# Patient Record
Sex: FEMALE | Race: WHITE | HISPANIC OR LATINO | Employment: FULL TIME | URBAN - METROPOLITAN AREA
[De-identification: names, ages, dates, MRNs, and addresses within clinical notes are randomized per-mention and may not be internally consistent; named-entity substitution may affect disease eponyms.]

---
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
## 2021-01-25 ENCOUNTER — APPOINTMENT (OUTPATIENT)
Dept: RADIOLOGY | Facility: MEDICAL CENTER | Age: 21
End: 2021-01-25
Attending: EMERGENCY MEDICINE
Payer: MEDICAID

## 2021-01-25 ENCOUNTER — HOSPITAL ENCOUNTER (EMERGENCY)
Facility: MEDICAL CENTER | Age: 21
End: 2021-01-25
Attending: EMERGENCY MEDICINE
Payer: MEDICAID

## 2021-01-25 VITALS
SYSTOLIC BLOOD PRESSURE: 105 MMHG | TEMPERATURE: 97.8 F | WEIGHT: 152.34 LBS | OXYGEN SATURATION: 99 % | BODY MASS INDEX: 23.91 KG/M2 | HEART RATE: 77 BPM | HEIGHT: 67 IN | DIASTOLIC BLOOD PRESSURE: 60 MMHG | RESPIRATION RATE: 16 BRPM

## 2021-01-25 DIAGNOSIS — N39.0 URINARY TRACT INFECTION WITHOUT HEMATURIA, SITE UNSPECIFIED: ICD-10-CM

## 2021-01-25 DIAGNOSIS — R10.32 LEFT LOWER QUADRANT ABDOMINAL PAIN: ICD-10-CM

## 2021-01-25 DIAGNOSIS — Z34.90 INTRAUTERINE PREGNANCY: ICD-10-CM

## 2021-01-25 LAB
ALBUMIN SERPL BCP-MCNC: 3.7 G/DL (ref 3.2–4.9)
ALBUMIN/GLOB SERPL: 1.1 G/DL
ALP SERPL-CCNC: 84 U/L (ref 30–99)
ALT SERPL-CCNC: 10 U/L (ref 2–50)
ANION GAP SERPL CALC-SCNC: 11 MMOL/L (ref 7–16)
APPEARANCE UR: CLEAR
AST SERPL-CCNC: 19 U/L (ref 12–45)
B-HCG SERPL-ACNC: ABNORMAL MIU/ML (ref 0–10)
BACTERIA #/AREA URNS HPF: ABNORMAL /HPF
BASOPHILS # BLD AUTO: 0.3 % (ref 0–1.8)
BASOPHILS # BLD: 0.03 K/UL (ref 0–0.12)
BILIRUB SERPL-MCNC: <0.2 MG/DL (ref 0.1–1.5)
BILIRUB UR QL STRIP.AUTO: NEGATIVE
BUN SERPL-MCNC: 6 MG/DL (ref 8–22)
CALCIUM SERPL-MCNC: 9 MG/DL (ref 8.4–10.2)
CHLORIDE SERPL-SCNC: 102 MMOL/L (ref 96–112)
CO2 SERPL-SCNC: 22 MMOL/L (ref 20–33)
COLOR UR: YELLOW
CREAT SERPL-MCNC: 0.4 MG/DL (ref 0.5–1.4)
EOSINOPHIL # BLD AUTO: 0.07 K/UL (ref 0–0.51)
EOSINOPHIL NFR BLD: 0.6 % (ref 0–6.9)
EPI CELLS #/AREA URNS HPF: ABNORMAL /HPF
ERYTHROCYTE [DISTWIDTH] IN BLOOD BY AUTOMATED COUNT: 44.4 FL (ref 35.9–50)
GLOBULIN SER CALC-MCNC: 3.4 G/DL (ref 1.9–3.5)
GLUCOSE SERPL-MCNC: 87 MG/DL (ref 65–99)
GLUCOSE UR STRIP.AUTO-MCNC: NEGATIVE MG/DL
HCT VFR BLD AUTO: 35 % (ref 37–47)
HGB BLD-MCNC: 10.6 G/DL (ref 12–16)
HYALINE CASTS #/AREA URNS LPF: ABNORMAL /LPF
IMM GRANULOCYTES # BLD AUTO: 0.04 K/UL (ref 0–0.11)
IMM GRANULOCYTES NFR BLD AUTO: 0.4 % (ref 0–0.9)
KETONES UR STRIP.AUTO-MCNC: NEGATIVE MG/DL
LEUKOCYTE ESTERASE UR QL STRIP.AUTO: ABNORMAL
LYMPHOCYTES # BLD AUTO: 1.81 K/UL (ref 1–4.8)
LYMPHOCYTES NFR BLD: 16.3 % (ref 22–41)
MCH RBC QN AUTO: 21.6 PG (ref 27–33)
MCHC RBC AUTO-ENTMCNC: 30.3 G/DL (ref 33.6–35)
MCV RBC AUTO: 71.4 FL (ref 81.4–97.8)
MICRO URNS: ABNORMAL
MONOCYTES # BLD AUTO: 0.81 K/UL (ref 0–0.85)
MONOCYTES NFR BLD AUTO: 7.3 % (ref 0–13.4)
MUCOUS THREADS #/AREA URNS HPF: ABNORMAL /HPF
NEUTROPHILS # BLD AUTO: 8.36 K/UL (ref 2–7.15)
NEUTROPHILS NFR BLD: 75.1 % (ref 44–72)
NITRITE UR QL STRIP.AUTO: NEGATIVE
NRBC # BLD AUTO: 0 K/UL
NRBC BLD-RTO: 0 /100 WBC
PH UR STRIP.AUTO: 7 [PH] (ref 5–8)
PLATELET # BLD AUTO: 346 K/UL (ref 164–446)
PMV BLD AUTO: 11.2 FL (ref 9–12.9)
POTASSIUM SERPL-SCNC: 3.8 MMOL/L (ref 3.6–5.5)
PROT SERPL-MCNC: 7.1 G/DL (ref 6–8.2)
PROT UR QL STRIP: NEGATIVE MG/DL
RBC # BLD AUTO: 4.9 M/UL (ref 4.2–5.4)
RBC # URNS HPF: ABNORMAL /HPF
RBC UR QL AUTO: NEGATIVE
SODIUM SERPL-SCNC: 135 MMOL/L (ref 135–145)
SP GR UR STRIP.AUTO: 1.02
WBC # BLD AUTO: 11.1 K/UL (ref 4.8–10.8)
WBC #/AREA URNS HPF: ABNORMAL /HPF

## 2021-01-25 PROCEDURE — 81001 URINALYSIS AUTO W/SCOPE: CPT

## 2021-01-25 PROCEDURE — A9270 NON-COVERED ITEM OR SERVICE: HCPCS | Performed by: EMERGENCY MEDICINE

## 2021-01-25 PROCEDURE — 76705 ECHO EXAM OF ABDOMEN: CPT

## 2021-01-25 PROCEDURE — 76815 OB US LIMITED FETUS(S): CPT

## 2021-01-25 PROCEDURE — 80053 COMPREHEN METABOLIC PANEL: CPT

## 2021-01-25 PROCEDURE — 99284 EMERGENCY DEPT VISIT MOD MDM: CPT

## 2021-01-25 PROCEDURE — 84702 CHORIONIC GONADOTROPIN TEST: CPT

## 2021-01-25 PROCEDURE — 36415 COLL VENOUS BLD VENIPUNCTURE: CPT

## 2021-01-25 PROCEDURE — 700102 HCHG RX REV CODE 250 W/ 637 OVERRIDE(OP): Performed by: EMERGENCY MEDICINE

## 2021-01-25 PROCEDURE — 85025 COMPLETE CBC W/AUTO DIFF WBC: CPT

## 2021-01-25 RX ORDER — CEPHALEXIN 500 MG/1
500 CAPSULE ORAL 4 TIMES DAILY
Qty: 20 CAP | Refills: 0 | Status: ON HOLD | OUTPATIENT
Start: 2021-01-25 | End: 2021-07-03

## 2021-01-25 RX ORDER — CEPHALEXIN 250 MG/1
500 CAPSULE ORAL ONCE
Status: COMPLETED | OUTPATIENT
Start: 2021-01-25 | End: 2021-01-25

## 2021-01-25 RX ADMIN — CEPHALEXIN 500 MG: 250 CAPSULE ORAL at 13:01

## 2021-01-25 NOTE — ED NOTES
Pt given d/c paperwork and RX p/u information, pt verbalized understanding all information given. Pt ambulated out of the ER w/o difficulty w/ family

## 2021-01-25 NOTE — ED PROVIDER NOTES
"ED Provider Note    ED Provider    Means of arrival: Private vehicle  History obtained from: Patient  History limited by: None    CHIEF COMPLAINT  Chief Complaint   Patient presents with   • Pregnancy Problem     Low left sided abdominal pain started yesterday       HPI  Porfirio Keenan is a 20 y.o. female who presents with complaints of left lower quadrant abdominal pain.  This started last evening is been constant.  Is been waxing and waning.  This time is moderate.  Has been isolated to the left lower quadrant.  She has no flank pain, no pelvic pain.  She has no dysuria or hematuria.  She is noted to be 14 weeks pregnant is been seen by her OB and has had ultrasounds done.    Said no fevers chills or sweats no nausea no vomiting no diarrhea she complains of some constipation but did have a bowel movement yesterday without difficulty.    The pain is described as sharp.    REVIEW OF SYSTEMS  See HPI for further details. All other systems are negative.     PAST MEDICAL HISTORY   has a past medical history of Anemia, Breath shortness, and Cold.    SOCIAL HISTORY  Social History     Tobacco Use   • Smoking status: Never Smoker   • Smokeless tobacco: Never Used   Substance and Sexual Activity   • Alcohol use: No   • Drug use: No   • Sexual activity: Not on file       SURGICAL HISTORY  patient denies any surgical history    CURRENT MEDICATIONS  Home Medications    **Home medications have not yet been reviewed for this encounter**         ALLERGIES  No Known Allergies    PHYSICAL EXAM  VITAL SIGNS: /60   Pulse 77   Temp 36.6 °C (97.8 °F) (Temporal)   Resp 16   Ht 1.702 m (5' 7\")   Wt 69.1 kg (152 lb 5.4 oz)   LMP 10/17/2020   SpO2 99%   Breastfeeding No   BMI 23.86 kg/m²   Constitutional: Alert in no apparent distress.  HENT: No signs of trauma, Mucous membranes are moist   Eyes:  Conjunctiva normal, Non-icteric.   Neck: Normal range of motion, No tenderness, Supple,  Lymphatic: No lymphadenopathy noted. "   Cardiovascular: Regular rate and rhythm, no murmurs.   Thorax & Lungs: Normal breath sounds, No respiratory distress, No wheezing, No chest tenderness.   Abdomen: Bowel sounds normal, Soft, minimal tenderness left lower pelvis, no midline tenderness, No masses, No pulsatile masses. No peritoneal signs.  Skin: Warm, Dry,Normal color  Back: No bony tenderness, No CVA tenderness.   Extremities:No edema, No tenderness, No cyanosis,    Musculoskeletal: Good range of motion in all major joints. No tenderness to palpation or major deformities noted.   Neurologic: Alert ,Oriented x4, Normal motor function, Normal sensory function, No focal deficits noted.   Psychiatric: Affect normal, Judgment normal, Mood normal.       MEDICAL DECISION MAKING  This is a 20 y.o. female who presents with complaints of pain as described.  Left lower quadrant she is too young to consider diverticulitis or diverticulosis.  Round ligament pain could be etiology, appendix cannot be completely ruled out so CBC will be done to evaluate for significant elevation of white blood cell count.  Ultrasound will be done to evaluate for ovarian cyst, and will be used to attempt visualization of appendix.  Urinalysis with 1 to evaluate for urinary infection.    DIAGNOSTIC STUDIES / PROCEDURES    EKG      LABS  Results for orders placed or performed during the hospital encounter of 01/25/21   URINALYSIS,CULTURE IF INDICATED    Specimen: Urine   Result Value Ref Range    Color Yellow     Character Clear     Specific Gravity 1.020 <1.035    Ph 7.0 5.0 - 8.0    Glucose Negative Negative mg/dL    Ketones Negative Negative mg/dL    Protein Negative Negative mg/dL    Bilirubin Negative Negative    Nitrite Negative Negative    Leukocyte Esterase Small (A) Negative    Occult Blood Negative Negative    Micro Urine Req Microscopic    URINE MICROSCOPIC (W/UA)   Result Value Ref Range    WBC 10-20 (A) /hpf    RBC 0-2 /hpf    Bacteria Moderate (A) None /hpf     Epithelial Cells Many (A) Few /hpf    Mucous Threads Moderate /hpf    Hyaline Cast 0-2 /lpf   CBC WITH DIFFERENTIAL   Result Value Ref Range    WBC 11.1 (H) 4.8 - 10.8 K/uL    RBC 4.90 4.20 - 5.40 M/uL    Hemoglobin 10.6 (L) 12.0 - 16.0 g/dL    Hematocrit 35.0 (L) 37.0 - 47.0 %    MCV 71.4 (L) 81.4 - 97.8 fL    MCH 21.6 (L) 27.0 - 33.0 pg    MCHC 30.3 (L) 33.6 - 35.0 g/dL    RDW 44.4 35.9 - 50.0 fL    Platelet Count 346 164 - 446 K/uL    MPV 11.2 9.0 - 12.9 fL    Neutrophils-Polys 75.10 (H) 44.00 - 72.00 %    Lymphocytes 16.30 (L) 22.00 - 41.00 %    Monocytes 7.30 0.00 - 13.40 %    Eosinophils 0.60 0.00 - 6.90 %    Basophils 0.30 0.00 - 1.80 %    Immature Granulocytes 0.40 0.00 - 0.90 %    Nucleated RBC 0.00 /100 WBC    Neutrophils (Absolute) 8.36 (H) 2.00 - 7.15 K/uL    Lymphs (Absolute) 1.81 1.00 - 4.80 K/uL    Monos (Absolute) 0.81 0.00 - 0.85 K/uL    Eos (Absolute) 0.07 0.00 - 0.51 K/uL    Baso (Absolute) 0.03 0.00 - 0.12 K/uL    Immature Granulocytes (abs) 0.04 0.00 - 0.11 K/uL    NRBC (Absolute) 0.00 K/uL   COMP METABOLIC PANEL   Result Value Ref Range    Sodium 135 135 - 145 mmol/L    Potassium 3.8 3.6 - 5.5 mmol/L    Chloride 102 96 - 112 mmol/L    Co2 22 20 - 33 mmol/L    Anion Gap 11.0 7.0 - 16.0    Glucose 87 65 - 99 mg/dL    Bun 6 (L) 8 - 22 mg/dL    Creatinine 0.40 (L) 0.50 - 1.40 mg/dL    Calcium 9.0 8.4 - 10.2 mg/dL    AST(SGOT) 19 12 - 45 U/L    ALT(SGPT) 10 2 - 50 U/L    Alkaline Phosphatase 84 30 - 99 U/L    Total Bilirubin <0.2 0.1 - 1.5 mg/dL    Albumin 3.7 3.2 - 4.9 g/dL    Total Protein 7.1 6.0 - 8.2 g/dL    Globulin 3.4 1.9 - 3.5 g/dL    A-G Ratio 1.1 g/dL   HCG QUANTITATIVE SERUM   Result Value Ref Range    Bhcg 51100.0 (H) 0.0 - 10.0 mIU/mL   ESTIMATED GFR   Result Value Ref Range    GFR If African American >60 >60 mL/min/1.73 m 2    GFR If Non African American >60 >60 mL/min/1.73 m 2         RADIOLOGY  US-APPENDIX   Final Result      Appendix is not identified sonographically. No ancillary  findings to support acute appendicitis.      US-OB LIMITED TRANSABDOMINAL   Final Result      Single intrauterine pregnancy of an estimated gestational age of 15 weeks with an estimated date of delivery of 7/24/2021.      Viable single intrauterine gestation with no perigestational hemorrhage, cervical shortening or other concerning findings      No free pelvic fluid         INTERPRETING LOCATION: [address], [place], [address]          COURSE  Pertinent Labs & Imaging studies reviewed. (See chart for details)    11:01 AM - Patient seen and examined at bedside. Discussed plan of care,      Patient present with left-sided pelvic pain.  Ultrasound was done shows no problems with pregnancy.  There is a live IUP at 15 weeks.  There is no signs of ovarian cyst.  Attempts were made to visualize the appendix which were unsuccessful.    Urinalysis shows a mild urinary tract infection, she is being placed on antibiotics for this.  I cannot specify the cause of her left lower quadrant abdominal pain.  Her white blood cell count is 11.1 which is only mildly elevated she has no peritoneal signs I do not suspect appendicitis due to physical exam.    I cannot completely exclude appendicitis with the patient is instructed to return if her symptoms are not resolved in 12 to 24 hours at which time consideration for CT will be made as CT has contraindicated during pregnancy.      Discharge home in stable condition    FINAL IMPRESSION  1. Left lower quadrant abdominal pain    2. Intrauterine pregnancy    3. Urinary tract infection without hematuria, site unspecified        The note accurately reflects work and decisions made by me.  Gutierrez Ramon D.O.  1/25/2021  1:51 PM

## 2021-06-03 ENCOUNTER — HOSPITAL ENCOUNTER (EMERGENCY)
Facility: MEDICAL CENTER | Age: 21
End: 2021-06-03
Attending: OBSTETRICS & GYNECOLOGY | Admitting: OBSTETRICS & GYNECOLOGY
Payer: MEDICAID

## 2021-06-03 VITALS
HEART RATE: 108 BPM | BODY MASS INDEX: 23.51 KG/M2 | HEIGHT: 68 IN | SYSTOLIC BLOOD PRESSURE: 123 MMHG | DIASTOLIC BLOOD PRESSURE: 78 MMHG | OXYGEN SATURATION: 99 % | TEMPERATURE: 96.9 F

## 2021-06-03 LAB
APPEARANCE UR: ABNORMAL
COLOR UR AUTO: YELLOW
GLUCOSE UR QL STRIP.AUTO: NEGATIVE MG/DL
KETONES UR QL STRIP.AUTO: NEGATIVE MG/DL
LEUKOCYTE ESTERASE UR QL STRIP.AUTO: ABNORMAL
NITRITE UR QL STRIP.AUTO: NEGATIVE
PH UR STRIP.AUTO: 7 [PH] (ref 5–8)
PROT UR QL STRIP: NEGATIVE MG/DL
RBC UR QL AUTO: NEGATIVE
SP GR UR STRIP.AUTO: 1.02 (ref 1–1.03)

## 2021-06-03 PROCEDURE — 302449 STATCHG TRIAGE ONLY (STATISTIC)

## 2021-06-03 PROCEDURE — 81002 URINALYSIS NONAUTO W/O SCOPE: CPT

## 2021-06-03 PROCEDURE — 59025 FETAL NON-STRESS TEST: CPT

## 2021-06-03 ASSESSMENT — PAIN SCALES - GENERAL: PAINLEVEL: 3

## 2021-06-03 NOTE — PROGRESS NOTES
[facility]    2021    [facility]   32w5d    0855: TOCO/US applied, pt educated. Pt presents with c/o round ligament pain that started yesterday. Pt states pain is in the lower left abdominal area. Pt declines LOF, VB, UC's, and states +FM.     918: Dr. Becerra updated with patient status, orders received to discharge patient home with  labor precautions.     930: Discharge instructions gone over with patient, all questions and concerns addressed.

## 2021-06-03 NOTE — CONSULTS
DATE OF SERVICE:  2021     NST WITH CONSULTATION     This is a patient of mine who is a 20-year-old  1, para 0 at 32 and   5/7th weeks' gestational age.  She presented to the hospital with complaints   of lower abdominal pain, which we have decided is most likely round ligament   pain.  She has been monitored here on labor and delivery and has a category 1   reactive tracing in the 140s.  We ran her urine, which is completely clean.    She does not have any uterine contractions noted on the monitor.  We have   explained to her the round ligament pain is very common in pregnancy and she   will now be discharged home in stable condition with instructions to follow up   at her regular scheduled appointment in my office.        ______________________________  Corinne E. Capurro, MD C[facility]/CLAUDIA    DD:  2021 09:26  DT:  2021 12:24    Job#:  [id number]

## 2021-06-18 ENCOUNTER — HOSPITAL ENCOUNTER (EMERGENCY)
Facility: MEDICAL CENTER | Age: 21
End: 2021-06-18
Attending: OBSTETRICS & GYNECOLOGY | Admitting: OBSTETRICS & GYNECOLOGY
Payer: MEDICAID

## 2021-06-18 VITALS
DIASTOLIC BLOOD PRESSURE: 54 MMHG | RESPIRATION RATE: 20 BRPM | SYSTOLIC BLOOD PRESSURE: 101 MMHG | TEMPERATURE: 97.2 F | HEART RATE: 105 BPM | BODY MASS INDEX: 27.78 KG/M2 | HEIGHT: 67 IN | WEIGHT: 177 LBS

## 2021-06-18 LAB
ALBUMIN SERPL BCP-MCNC: 3 G/DL (ref 3.2–4.9)
ALBUMIN/GLOB SERPL: 0.9 G/DL
ALP SERPL-CCNC: 161 U/L (ref 30–99)
ALT SERPL-CCNC: 11 U/L (ref 2–50)
ANION GAP SERPL CALC-SCNC: 9 MMOL/L (ref 7–16)
ANISOCYTOSIS BLD QL SMEAR: ABNORMAL
APPEARANCE UR: CLEAR
AST SERPL-CCNC: 20 U/L (ref 12–45)
BASOPHILS # BLD AUTO: 0.2 % (ref 0–1.8)
BASOPHILS # BLD: 0.02 K/UL (ref 0–0.12)
BILIRUB SERPL-MCNC: 0.2 MG/DL (ref 0.1–1.5)
BUN SERPL-MCNC: 7 MG/DL (ref 8–22)
CALCIUM SERPL-MCNC: 8.7 MG/DL (ref 8.5–10.5)
CHLORIDE SERPL-SCNC: 104 MMOL/L (ref 96–112)
CO2 SERPL-SCNC: 21 MMOL/L (ref 20–33)
COLOR UR AUTO: YELLOW
COMMENT 1642: NORMAL
CREAT SERPL-MCNC: 0.43 MG/DL (ref 0.5–1.4)
CREAT UR-MCNC: 207.24 MG/DL
DACRYOCYTES BLD QL SMEAR: NORMAL
EOSINOPHIL # BLD AUTO: 0.06 K/UL (ref 0–0.51)
EOSINOPHIL NFR BLD: 0.5 % (ref 0–6.9)
ERYTHROCYTE [DISTWIDTH] IN BLOOD BY AUTOMATED COUNT: 55.8 FL (ref 35.9–50)
GLOBULIN SER CALC-MCNC: 3.3 G/DL (ref 1.9–3.5)
GLUCOSE SERPL-MCNC: 93 MG/DL (ref 65–99)
GLUCOSE UR QL STRIP.AUTO: NEGATIVE MG/DL
HCT VFR BLD AUTO: 31.2 % (ref 37–47)
HGB BLD-MCNC: 8.2 G/DL (ref 12–16)
IMM GRANULOCYTES # BLD AUTO: 0.12 K/UL (ref 0–0.11)
IMM GRANULOCYTES NFR BLD AUTO: 1 % (ref 0–0.9)
KETONES UR QL STRIP.AUTO: NEGATIVE MG/DL
LEUKOCYTE ESTERASE UR QL STRIP.AUTO: NEGATIVE
LYMPHOCYTES # BLD AUTO: 1.75 K/UL (ref 1–4.8)
LYMPHOCYTES NFR BLD: 14.9 % (ref 22–41)
MACROCYTES BLD QL SMEAR: ABNORMAL
MCH RBC QN AUTO: 17.6 PG (ref 27–33)
MCHC RBC AUTO-ENTMCNC: 26.3 G/DL (ref 33.6–35)
MCV RBC AUTO: 66.8 FL (ref 81.4–97.8)
MICROCYTES BLD QL SMEAR: ABNORMAL
MONOCYTES # BLD AUTO: 0.82 K/UL (ref 0–0.85)
MONOCYTES NFR BLD AUTO: 7 % (ref 0–13.4)
MORPHOLOGY BLD-IMP: NORMAL
NEUTROPHILS # BLD AUTO: 9 K/UL (ref 2–7.15)
NEUTROPHILS NFR BLD: 76.4 % (ref 44–72)
NITRITE UR QL STRIP.AUTO: NEGATIVE
NRBC # BLD AUTO: 0.04 K/UL
NRBC BLD-RTO: 0.3 /100 WBC
OVALOCYTES BLD QL SMEAR: NORMAL
PH UR STRIP.AUTO: 5.5 [PH] (ref 5–8)
PLATELET # BLD AUTO: 273 K/UL (ref 164–446)
PLATELET BLD QL SMEAR: NORMAL
POIKILOCYTOSIS BLD QL SMEAR: NORMAL
POLYCHROMASIA BLD QL SMEAR: NORMAL
POTASSIUM SERPL-SCNC: 3.8 MMOL/L (ref 3.6–5.5)
PROT SERPL-MCNC: 6.3 G/DL (ref 6–8.2)
PROT UR QL STRIP: 100 MG/DL
PROT UR-MCNC: 175 MG/DL (ref 0–15)
PROT/CREAT UR: 844 MG/G (ref 10–107)
RBC # BLD AUTO: 4.67 M/UL (ref 4.2–5.4)
RBC BLD AUTO: PRESENT
RBC UR QL AUTO: ABNORMAL
SODIUM SERPL-SCNC: 134 MMOL/L (ref 135–145)
SP GR UR STRIP.AUTO: >=1.03 (ref 1–1.03)
WBC # BLD AUTO: 11.8 K/UL (ref 4.8–10.8)

## 2021-06-18 PROCEDURE — 36415 COLL VENOUS BLD VENIPUNCTURE: CPT

## 2021-06-18 PROCEDURE — 82570 ASSAY OF URINE CREATININE: CPT

## 2021-06-18 PROCEDURE — 85025 COMPLETE CBC W/AUTO DIFF WBC: CPT

## 2021-06-18 PROCEDURE — 99283 EMERGENCY DEPT VISIT LOW MDM: CPT

## 2021-06-18 PROCEDURE — 59025 FETAL NON-STRESS TEST: CPT

## 2021-06-18 PROCEDURE — 80053 COMPREHEN METABOLIC PANEL: CPT

## 2021-06-18 PROCEDURE — 81002 URINALYSIS NONAUTO W/O SCOPE: CPT

## 2021-06-18 PROCEDURE — 700102 HCHG RX REV CODE 250 W/ 637 OVERRIDE(OP): Performed by: OBSTETRICS & GYNECOLOGY

## 2021-06-18 PROCEDURE — A9270 NON-COVERED ITEM OR SERVICE: HCPCS | Performed by: OBSTETRICS & GYNECOLOGY

## 2021-06-18 PROCEDURE — 84156 ASSAY OF PROTEIN URINE: CPT

## 2021-06-18 RX ORDER — ACETAMINOPHEN 500 MG
1000 TABLET ORAL ONCE
Status: COMPLETED | OUTPATIENT
Start: 2021-06-18 | End: 2021-06-18

## 2021-06-18 RX ADMIN — ACETAMINOPHEN 1000 MG: 500 TABLET ORAL at 01:43

## 2021-06-18 ASSESSMENT — FIBROSIS 4 INDEX: FIB4 SCORE: 0.33

## 2021-06-18 NOTE — ED PROVIDER NOTES
Non-Stress Test    Date: 21    Maternal chief complaint: Lower extremity edema and headache.    NST Interpretation:  Baseline: 130 bpm  Variability: moderate  Accelerations: present  Decelerations: absent  Tocometer: quiet    Interpretation: Reactive and reassuring for gestational age.     Assessment:  20 year old  at 34+6 weeks  1.  intrauterine pregnancy at 34+6 weeks  2. Lower extremity edema  3. Headache    Discussion: Bps are normal and pre-eclampsia labs are normal. Per RN the patient's headache resolved with Tylenol PO. Pr/Cr ratio is pending. Patient to follow up with Dr. Becerra tomorrow. Recommended Nuun electrolytes for edema and headaches. 1/2 tab in 40oz. of water three times a day.     Plan:  - Okay to discharge home  -  labor precautions  - Fetal kick counts  - Follow up outpatient with Dr. Becerra tomorrow.

## 2021-06-18 NOTE — DISCHARGE INSTRUCTIONS
General Instructions:  · If you think you are in labor, time contractions (lying on your left side) from the beginning of one contraction to the beginning of the next contraction for at least one hour.  · Increase fluid intake: you should consume 10-12 8 oz glasses of non-caffeinated fluid per day.  · Report any pressure or burning on urination to your physician.  · Monitor fetal movement: If you notice an absence or decrease in fetal movement, drink a large glass of water and rest on your side.  If there is no increase in movement, call your physician or go to the hospital for further evaluation.  · Report any sudden, sharp abdominal pain.  · Report any bleeding.  Spotting or pinkish discharge is normal after vaginal exam.  You may also spot after sexual intercourse.    Pre-term Labor (<37 weeks):  Call your physician or return to the hospital if:  · You have painless regular contractions more than 4 in one hour.  · Your water breaks (remember time and color).  · You have menstrual-like cramps, a low dull backache or pressure in your pelvis or back.  · Your baby does not move enough to complete the daily kick count (10 movements in 2 hours).  · Your baby moves much less often than on the days before or you have not felt your baby move all day.  · Please review the MEDICATION LIST section of your AFTER VISIT SUMMARY document.  · Take your medication as prescribed    High Blood Pressure:  · Rest on your right or left side.  · Report any severe headaches, dizziness, blurred vision or spots before your eyes.  · Report excessive swelling of your hands, face or feet.  · Report epigastric pain (upper abdominal pain)      Other Instructions:  If Headache worsens then come back to triage for monitoring  Take NUUN electrolyte replacement, 0.5 tablet in 40oz of water, 3 times a day.  Drink at least a gallon of water a day  OTC tylenol for headache or discomfort.  Please carefully review your entire AFTER VISIT SUMMARY  document for all discharge instructions.

## 2021-06-18 NOTE — PROGRESS NOTES
"0105- pt is a , 34.6 weeks gestation IUP, with c/o visual \"fogginess\", \"extreme\" HA, and nausea q1hour. BLE pitting edema noted. No c/o RUQ pain, lof, bleeding, uc's or dfm. efm and toco placed, vss, BP elevated, cycling.   0120- Dr. Griffin called and given report, received orders for PIH w/u, and tylenol 1000mg po x1. Discussed poc with pt. Tylenol given po (see mar)  0230- Dr. Griffin called to check in on pt, pt states HA has improved, received orders to discharge home without p/cr results, inform pt of NUUN electrolyte replacement tablets 3x/day, follow up next week. Discussed poc with pt  0245- Dr. Griffin called and updated with p/cr results, change plan for pt to come to office later today for BP check. If HA worsens then return to triage. Discussed poc with pt.  0250- Dr. Griffin at bedside discussing poc with pt.  0300- addressed discharge instructions with PTL precautions and s/s pre-e, BP check in office today, return to triage if HA worsens, all questions answered, verbalized understanding. Left off floor stable, undelivered, with SO at side.  "

## 2021-06-29 ENCOUNTER — HOSPITAL ENCOUNTER (OUTPATIENT)
Dept: OBGYN | Facility: MEDICAL CENTER | Age: 21
End: 2021-06-29
Attending: OBSTETRICS & GYNECOLOGY
Payer: MEDICAID

## 2021-06-29 LAB
SARS-COV-2 RNA RESP QL NAA+PROBE: NOTDETECTED
SPECIMEN SOURCE: NORMAL

## 2021-06-29 PROCEDURE — U0003 INFECTIOUS AGENT DETECTION BY NUCLEIC ACID (DNA OR RNA); SEVERE ACUTE RESPIRATORY SYNDROME CORONAVIRUS 2 (SARS-COV-2) (CORONAVIRUS DISEASE [COVID-19]), AMPLIFIED PROBE TECHNIQUE, MAKING USE OF HIGH THROUGHPUT TECHNOLOGIES AS DESCRIBED BY CMS-2020-01-R: HCPCS

## 2021-06-29 PROCEDURE — U0005 INFEC AGEN DETEC AMPLI PROBE: HCPCS

## 2021-06-30 NOTE — H&P
DATE OF SERVICE:  2021     PATIENT IDENTIFICATION:  A 20-year-old  1, para 0 at 34 weeks and 6   days by last menstrual period, EDC 2021.     CHIEF COMPLAINT:  Lower extremity edema and headaches.     HISTORY OF PRESENT ILLNESS:  The patient has prenatal care with Dr. Becerra.    Her pregnancy has been uncomplicated.  Per chart review, she has had no   elevated blood pressures thus far in her pregnancy.  The patient presented   today with complaints of bilateral lower extremity edema as well as headaches.    She has not taken anything for her headache.  She denies visual changes or   right upper quadrant pain.  She endorses positive fetal movement.  She denies   vaginal bleeding or loss of fluid.  She denies contractions.     REVIEW OF SYSTEMS:  Denies fevers, chills, shortness of breath, chest pain,   nausea, vomiting, diarrhea, or dysuria.     PHYSICAL EXAMINATION:   VITAL SIGNS:  Blood pressure is to 110s-120s over 60s-80s.  GENERAL:  Alert, conversational, pleasant, no acute distress.  HEENT:  Moist mucous membranes.  CARDIOVASCULAR:  Regular rate.  PULMONARY:  No respiratory distress.  CHEST:  Symmetric expansion.  ABDOMEN:  Soft, nontender, nondistended, gravid.  GENITOURINARY:  Exam deferred.  EXTREMITIES:  A 1+ edema in the bilateral lower extremities up to the level of   the knees.  Moves all without difficulty.     LABORATORY STUDIES:  Hemoglobin 18.2, platelets 273.  Creatinine 0.43.  AST   20, ALT 11.  Protein creatinine ratio 844.     ASSESSMENT: A 20-year-old  1, para 0 at 34 weeks and 6 days by last   menstrual period, EDC 2021.    1.   intrauterine pregnancy at 34 weeks and 6 days.  2.  Bilateral lower extremity edema.  3.  Headache, now resolved.  4.  Elevated protein creatinine ratio.  5.  Iron deficiency anemia.     DISCUSSION:  The patient was seen and evaluated at bedside.  She had reported   poor p.o. intake with the increase in temperature outside.  She  was given   Tylenol in triage and her headache is went from 6/10 to 2/10.  The patient's   blood pressures were all normal in triage.  She does have an elevated protein   creatinine ratio of 844.  She was also found to be anemic.  I recommended that   she follow up with Dr. Becerra or one of our nurse practitioners tomorrow for   a blood pressure check.  Preeclampsia precautions were discussed with her.    At this time, she does not meet diagnostic criteria for gestational   hypertension or preeclampsia; however, her symptoms suggest that she has   likely developing a hypertensive disorder in pregnancy.     PLAN:   1.  Okay to discharge home.  2.  Preeclampsia precautions.  3.   labor precautions.  4.  Fetal kick counts.  5.  Ferrous sulfate 325 mg p.o. b.i.d.  6.  Nuun electrolytes half a tab in 40 ounces of water 3 times a day.        ______________________________  MD CHIOMA Rush/KRISTA    DD:  2021 07:41  DT:  2021 07:55    Job#:  [id number]

## 2021-07-03 ENCOUNTER — HOSPITAL ENCOUNTER (INPATIENT)
Facility: MEDICAL CENTER | Age: 21
LOS: 2 days | End: 2021-07-05
Attending: OBSTETRICS & GYNECOLOGY | Admitting: OBSTETRICS & GYNECOLOGY
Payer: MEDICAID

## 2021-07-03 ENCOUNTER — ANESTHESIA (OUTPATIENT)
Dept: ANESTHESIOLOGY | Facility: MEDICAL CENTER | Age: 21
End: 2021-07-03
Payer: MEDICAID

## 2021-07-03 ENCOUNTER — ANESTHESIA EVENT (OUTPATIENT)
Dept: ANESTHESIOLOGY | Facility: MEDICAL CENTER | Age: 21
End: 2021-07-03
Payer: MEDICAID

## 2021-07-03 LAB
ALBUMIN SERPL BCP-MCNC: 3.1 G/DL (ref 3.2–4.9)
ALBUMIN/GLOB SERPL: 1 G/DL
ALP SERPL-CCNC: 172 U/L (ref 30–99)
ALT SERPL-CCNC: 12 U/L (ref 2–50)
ANION GAP SERPL CALC-SCNC: 13 MMOL/L (ref 7–16)
ANISOCYTOSIS BLD QL SMEAR: ABNORMAL
AST SERPL-CCNC: 39 U/L (ref 12–45)
BASOPHILS # BLD AUTO: 0.2 % (ref 0–1.8)
BASOPHILS # BLD: 0.02 K/UL (ref 0–0.12)
BILIRUB SERPL-MCNC: <0.2 MG/DL (ref 0.1–1.5)
BUN SERPL-MCNC: 9 MG/DL (ref 8–22)
CALCIUM SERPL-MCNC: 8.2 MG/DL (ref 8.5–10.5)
CHLORIDE SERPL-SCNC: 109 MMOL/L (ref 96–112)
CO2 SERPL-SCNC: 17 MMOL/L (ref 20–33)
COMMENT 1642: NORMAL
CREAT SERPL-MCNC: 0.33 MG/DL (ref 0.5–1.4)
DACRYOCYTES BLD QL SMEAR: NORMAL
EOSINOPHIL # BLD AUTO: 0.08 K/UL (ref 0–0.51)
EOSINOPHIL NFR BLD: 0.8 % (ref 0–6.9)
ERYTHROCYTE [DISTWIDTH] IN BLOOD BY AUTOMATED COUNT: 65.2 FL (ref 35.9–50)
GLOBULIN SER CALC-MCNC: 3.2 G/DL (ref 1.9–3.5)
GLUCOSE SERPL-MCNC: 82 MG/DL (ref 65–99)
HCT VFR BLD AUTO: 33.2 % (ref 37–47)
HGB BLD-MCNC: 9 G/DL (ref 12–16)
HOLDING TUBE BB 8507: NORMAL
HYPOCHROMIA BLD QL SMEAR: ABNORMAL
IMM GRANULOCYTES # BLD AUTO: 0.04 K/UL (ref 0–0.11)
IMM GRANULOCYTES NFR BLD AUTO: 0.4 % (ref 0–0.9)
LG PLATELETS BLD QL SMEAR: NORMAL
LYMPHOCYTES # BLD AUTO: 2.67 K/UL (ref 1–4.8)
LYMPHOCYTES NFR BLD: 28.1 % (ref 22–41)
MACROCYTES BLD QL SMEAR: ABNORMAL
MCH RBC QN AUTO: 18.6 PG (ref 27–33)
MCHC RBC AUTO-ENTMCNC: 27.1 G/DL (ref 33.6–35)
MCV RBC AUTO: 68.5 FL (ref 81.4–97.8)
MICROCYTES BLD QL SMEAR: ABNORMAL
MONOCYTES # BLD AUTO: 0.78 K/UL (ref 0–0.85)
MONOCYTES NFR BLD AUTO: 8.2 % (ref 0–13.4)
MORPHOLOGY BLD-IMP: NORMAL
NEUTROPHILS # BLD AUTO: 5.92 K/UL (ref 2–7.15)
NEUTROPHILS NFR BLD: 62.3 % (ref 44–72)
NRBC # BLD AUTO: 0 K/UL
NRBC BLD-RTO: 0 /100 WBC
OVALOCYTES BLD QL SMEAR: NORMAL
PLATELET # BLD AUTO: 285 K/UL (ref 164–446)
PLATELET BLD QL SMEAR: NORMAL
POIKILOCYTOSIS BLD QL SMEAR: NORMAL
POLYCHROMASIA BLD QL SMEAR: NORMAL
POTASSIUM SERPL-SCNC: 4.1 MMOL/L (ref 3.6–5.5)
PROT SERPL-MCNC: 6.3 G/DL (ref 6–8.2)
RBC # BLD AUTO: 4.85 M/UL (ref 4.2–5.4)
RBC BLD AUTO: PRESENT
SODIUM SERPL-SCNC: 139 MMOL/L (ref 135–145)
WBC # BLD AUTO: 9.5 K/UL (ref 4.8–10.8)

## 2021-07-03 PROCEDURE — 3E033VJ INTRODUCTION OF OTHER HORMONE INTO PERIPHERAL VEIN, PERCUTANEOUS APPROACH: ICD-10-PCS | Performed by: OBSTETRICS & GYNECOLOGY

## 2021-07-03 PROCEDURE — 700111 HCHG RX REV CODE 636 W/ 250 OVERRIDE (IP): Performed by: OBSTETRICS & GYNECOLOGY

## 2021-07-03 PROCEDURE — 3E0P7GC INTRODUCTION OF OTHER THERAPEUTIC SUBSTANCE INTO FEMALE REPRODUCTIVE, VIA NATURAL OR ARTIFICIAL OPENING: ICD-10-PCS | Performed by: OBSTETRICS & GYNECOLOGY

## 2021-07-03 PROCEDURE — 36415 COLL VENOUS BLD VENIPUNCTURE: CPT

## 2021-07-03 PROCEDURE — 10907ZC DRAINAGE OF AMNIOTIC FLUID, THERAPEUTIC FROM PRODUCTS OF CONCEPTION, VIA NATURAL OR ARTIFICIAL OPENING: ICD-10-PCS | Performed by: OBSTETRICS & GYNECOLOGY

## 2021-07-03 PROCEDURE — 700105 HCHG RX REV CODE 258: Performed by: OBSTETRICS & GYNECOLOGY

## 2021-07-03 PROCEDURE — 85025 COMPLETE CBC W/AUTO DIFF WBC: CPT

## 2021-07-03 PROCEDURE — 700111 HCHG RX REV CODE 636 W/ 250 OVERRIDE (IP): Performed by: ANESTHESIOLOGY

## 2021-07-03 PROCEDURE — 10H07YZ INSERTION OF OTHER DEVICE INTO PRODUCTS OF CONCEPTION, VIA NATURAL OR ARTIFICIAL OPENING: ICD-10-PCS | Performed by: OBSTETRICS & GYNECOLOGY

## 2021-07-03 PROCEDURE — 303615 HCHG EPIDURAL/SPINAL ANESTHESIA FOR LABOR

## 2021-07-03 PROCEDURE — 700111 HCHG RX REV CODE 636 W/ 250 OVERRIDE (IP)

## 2021-07-03 PROCEDURE — 0KQM0ZZ REPAIR PERINEUM MUSCLE, OPEN APPROACH: ICD-10-PCS | Performed by: OBSTETRICS & GYNECOLOGY

## 2021-07-03 PROCEDURE — A9270 NON-COVERED ITEM OR SERVICE: HCPCS

## 2021-07-03 PROCEDURE — A9270 NON-COVERED ITEM OR SERVICE: HCPCS | Performed by: OBSTETRICS & GYNECOLOGY

## 2021-07-03 PROCEDURE — 80053 COMPREHEN METABOLIC PANEL: CPT

## 2021-07-03 PROCEDURE — 770002 HCHG ROOM/CARE - OB PRIVATE (112)

## 2021-07-03 PROCEDURE — 700102 HCHG RX REV CODE 250 W/ 637 OVERRIDE(OP): Performed by: OBSTETRICS & GYNECOLOGY

## 2021-07-03 PROCEDURE — 59409 OBSTETRICAL CARE: CPT

## 2021-07-03 PROCEDURE — 700102 HCHG RX REV CODE 250 W/ 637 OVERRIDE(OP)

## 2021-07-03 PROCEDURE — 304965 HCHG RECOVERY SERVICES

## 2021-07-03 RX ORDER — ROPIVACAINE HYDROCHLORIDE 2 MG/ML
INJECTION, SOLUTION EPIDURAL; INFILTRATION; PERINEURAL
Status: COMPLETED
Start: 2021-07-03 | End: 2021-07-03

## 2021-07-03 RX ORDER — ROPIVACAINE HYDROCHLORIDE 2 MG/ML
INJECTION, SOLUTION EPIDURAL; INFILTRATION; PERINEURAL CONTINUOUS
Status: DISCONTINUED | OUTPATIENT
Start: 2021-07-03 | End: 2021-07-03

## 2021-07-03 RX ORDER — MISOPROSTOL 200 UG/1
600 TABLET ORAL
Status: DISCONTINUED | OUTPATIENT
Start: 2021-07-03 | End: 2021-07-05 | Stop reason: HOSPADM

## 2021-07-03 RX ORDER — SODIUM CHLORIDE, SODIUM LACTATE, POTASSIUM CHLORIDE, CALCIUM CHLORIDE 600; 310; 30; 20 MG/100ML; MG/100ML; MG/100ML; MG/100ML
1000 INJECTION, SOLUTION INTRAVENOUS CONTINUOUS
Status: ACTIVE | OUTPATIENT
Start: 2021-07-03 | End: 2021-07-03

## 2021-07-03 RX ORDER — FERROUS SULFATE 325(65) MG
325 TABLET ORAL DAILY
COMMUNITY
End: 2023-07-08

## 2021-07-03 RX ORDER — ONDANSETRON 2 MG/ML
4 INJECTION INTRAMUSCULAR; INTRAVENOUS EVERY 6 HOURS PRN
Status: DISCONTINUED | OUTPATIENT
Start: 2021-07-03 | End: 2021-07-05 | Stop reason: HOSPADM

## 2021-07-03 RX ORDER — IBUPROFEN 600 MG/1
600 TABLET ORAL EVERY 6 HOURS PRN
Status: DISCONTINUED | OUTPATIENT
Start: 2021-07-03 | End: 2021-07-05 | Stop reason: HOSPADM

## 2021-07-03 RX ORDER — ONDANSETRON 4 MG/1
4 TABLET, ORALLY DISINTEGRATING ORAL EVERY 6 HOURS PRN
Status: DISCONTINUED | OUTPATIENT
Start: 2021-07-03 | End: 2021-07-05 | Stop reason: HOSPADM

## 2021-07-03 RX ORDER — ONDANSETRON 2 MG/ML
INJECTION INTRAMUSCULAR; INTRAVENOUS
Status: COMPLETED
Start: 2021-07-03 | End: 2021-07-03

## 2021-07-03 RX ORDER — DOCUSATE SODIUM 100 MG/1
100 CAPSULE, LIQUID FILLED ORAL 2 TIMES DAILY PRN
Status: DISCONTINUED | OUTPATIENT
Start: 2021-07-03 | End: 2021-07-05 | Stop reason: HOSPADM

## 2021-07-03 RX ORDER — SODIUM CHLORIDE, SODIUM LACTATE, POTASSIUM CHLORIDE, AND CALCIUM CHLORIDE .6; .31; .03; .02 G/100ML; G/100ML; G/100ML; G/100ML
250 INJECTION, SOLUTION INTRAVENOUS PRN
Status: DISCONTINUED | OUTPATIENT
Start: 2021-07-03 | End: 2021-07-03 | Stop reason: HOSPADM

## 2021-07-03 RX ORDER — DEXTROSE, SODIUM CHLORIDE, SODIUM LACTATE, POTASSIUM CHLORIDE, AND CALCIUM CHLORIDE 5; .6; .31; .03; .02 G/100ML; G/100ML; G/100ML; G/100ML; G/100ML
INJECTION, SOLUTION INTRAVENOUS CONTINUOUS
Status: DISCONTINUED | OUTPATIENT
Start: 2021-07-03 | End: 2021-07-03

## 2021-07-03 RX ORDER — MISOPROSTOL 200 UG/1
TABLET ORAL
Status: COMPLETED
Start: 2021-07-03 | End: 2021-07-03

## 2021-07-03 RX ORDER — BUPIVACAINE HYDROCHLORIDE 2.5 MG/ML
INJECTION, SOLUTION EPIDURAL; INFILTRATION; INTRACAUDAL PRN
Status: DISCONTINUED | OUTPATIENT
Start: 2021-07-03 | End: 2021-07-03 | Stop reason: SURG

## 2021-07-03 RX ORDER — SODIUM CHLORIDE, SODIUM LACTATE, POTASSIUM CHLORIDE, CALCIUM CHLORIDE 600; 310; 30; 20 MG/100ML; MG/100ML; MG/100ML; MG/100ML
INJECTION, SOLUTION INTRAVENOUS PRN
Status: DISCONTINUED | OUTPATIENT
Start: 2021-07-03 | End: 2021-07-05 | Stop reason: HOSPADM

## 2021-07-03 RX ORDER — SODIUM CHLORIDE, SODIUM LACTATE, POTASSIUM CHLORIDE, AND CALCIUM CHLORIDE .6; .31; .03; .02 G/100ML; G/100ML; G/100ML; G/100ML
1000 INJECTION, SOLUTION INTRAVENOUS
Status: DISCONTINUED | OUTPATIENT
Start: 2021-07-03 | End: 2021-07-03 | Stop reason: HOSPADM

## 2021-07-03 RX ADMIN — MISOPROSTOL 800 MCG: 200 TABLET ORAL at 20:50

## 2021-07-03 RX ADMIN — OXYTOCIN 1 MILLI-UNITS/MIN: 10 INJECTION, SOLUTION INTRAMUSCULAR; INTRAVENOUS at 13:00

## 2021-07-03 RX ADMIN — OXYTOCIN 125 ML/HR: 10 INJECTION, SOLUTION INTRAMUSCULAR; INTRAVENOUS at 21:57

## 2021-07-03 RX ADMIN — BUPIVACAINE HYDROCHLORIDE 5 ML: 2.5 INJECTION, SOLUTION EPIDURAL; INFILTRATION; INTRACAUDAL; PERINEURAL at 13:42

## 2021-07-03 RX ADMIN — FENTANYL CITRATE 100 MCG: 50 INJECTION, SOLUTION INTRAMUSCULAR; INTRAVENOUS at 16:20

## 2021-07-03 RX ADMIN — ROPIVACAINE HYDROCHLORIDE 200 MG: 2 INJECTION, SOLUTION EPIDURAL; INFILTRATION at 13:43

## 2021-07-03 RX ADMIN — MISOPROSTOL 25 MCG: 100 TABLET ORAL at 04:35

## 2021-07-03 RX ADMIN — SODIUM CHLORIDE, POTASSIUM CHLORIDE, SODIUM LACTATE AND CALCIUM CHLORIDE 1000 ML: 600; 310; 30; 20 INJECTION, SOLUTION INTRAVENOUS at 03:55

## 2021-07-03 RX ADMIN — ONDANSETRON 4 MG: 2 INJECTION INTRAMUSCULAR; INTRAVENOUS at 18:27

## 2021-07-03 RX ADMIN — IBUPROFEN 600 MG: 600 TABLET, FILM COATED ORAL at 21:56

## 2021-07-03 RX ADMIN — OXYTOCIN 2000 ML/HR: 10 INJECTION, SOLUTION INTRAMUSCULAR; INTRAVENOUS at 20:43

## 2021-07-03 RX ADMIN — SODIUM CHLORIDE, SODIUM LACTATE, POTASSIUM CHLORIDE, CALCIUM CHLORIDE AND DEXTROSE MONOHYDRATE: 5; 600; 310; 30; 20 INJECTION, SOLUTION INTRAVENOUS at 18:29

## 2021-07-03 RX ADMIN — SODIUM CHLORIDE, POTASSIUM CHLORIDE, SODIUM LACTATE AND CALCIUM CHLORIDE 1000 ML: 600; 310; 30; 20 INJECTION, SOLUTION INTRAVENOUS at 07:06

## 2021-07-03 RX ADMIN — BUPIVACAINE HYDROCHLORIDE 5 ML: 2.5 INJECTION, SOLUTION EPIDURAL; INFILTRATION; INTRACAUDAL; PERINEURAL at 16:20

## 2021-07-03 ASSESSMENT — LIFESTYLE VARIABLES
HAVE YOU EVER FELT YOU SHOULD CUT DOWN ON YOUR DRINKING: NO
TOTAL SCORE: 0
TOTAL SCORE: 0
ALCOHOL_USE: NO
ON A TYPICAL DAY WHEN YOU DRINK ALCOHOL HOW MANY DRINKS DO YOU HAVE: 0
AVERAGE NUMBER OF DAYS PER WEEK YOU HAVE A DRINK CONTAINING ALCOHOL: 0
TOTAL SCORE: 0
HAVE PEOPLE ANNOYED YOU BY CRITICIZING YOUR DRINKING: NO
HOW MANY TIMES IN THE PAST YEAR HAVE YOU HAD 5 OR MORE DRINKS IN A DAY: 0
CONSUMPTION TOTAL: NEGATIVE
EVER_SMOKED: NEVER
EVER FELT BAD OR GUILTY ABOUT YOUR DRINKING: NO
EVER HAD A DRINK FIRST THING IN THE MORNING TO STEADY YOUR NERVES TO GET RID OF A HANGOVER: NO

## 2021-07-03 ASSESSMENT — PAIN SCALES - GENERAL
PAIN_LEVEL: 4
PAINLEVEL: 0 - NO PAIN

## 2021-07-03 ASSESSMENT — PATIENT HEALTH QUESTIONNAIRE - PHQ9
1. LITTLE INTEREST OR PLEASURE IN DOING THINGS: NOT AT ALL
2. FEELING DOWN, DEPRESSED, IRRITABLE, OR HOPELESS: NOT AT ALL
SUM OF ALL RESPONSES TO PHQ9 QUESTIONS 1 AND 2: 0

## 2021-07-03 ASSESSMENT — COPD QUESTIONNAIRES
DO YOU EVER COUGH UP ANY MUCUS OR PHLEGM?: NO/ONLY WITH OCCASIONAL COLDS OR INFECTIONS
IN THE PAST 12 MONTHS DO YOU DO LESS THAN YOU USED TO BECAUSE OF YOUR BREATHING PROBLEMS: DISAGREE/UNSURE
HAVE YOU SMOKED AT LEAST 100 CIGARETTES IN YOUR ENTIRE LIFE: NO/DON'T KNOW
COPD SCREENING SCORE: 0
DURING THE PAST 4 WEEKS HOW MUCH DID YOU FEEL SHORT OF BREATH: NONE/LITTLE OF THE TIME

## 2021-07-03 ASSESSMENT — FIBROSIS 4 INDEX: FIB4 SCORE: 0.42

## 2021-07-03 NOTE — PROGRESS NOTES
0700 - Report received from MORIS REED, care assumed. [place] Gestation today 37.0 Weeks      Patient is in bed awake - denies contractions/discomfort. FOB Flex and patients mother Shania at the BS resting on the couch.     Patient would like the FOB and and Shania at BS during the pushing and delivery phase.  Patient would like immediate skin/skin at delivery.   Once the cord has stopped pulsating, the FOB would like to cut the cord.   Planning to breastfeed withSimilac if necessary for supplementation    Denies ill feeling, reports FM, denies ROM or Bleeding, No change to vision/edema/HA- reports HA earlier today that resolved on its own; DTR 1+ without clonus, trace edema noted. States she has been getting up to the BR herself without assist, denies dizziness or weakness.     Use of FM/[place] discussed in place, discussed POC regarding the unit routine/visitors, food/drink, exams and assessments by the medical team; discussion of POC ongoing as needed. Pleasant/sleepy affect/mood. Review of labor process/expectations, breathing/relaxation techniques. Discussed pain management options - patient is uncertain whether she will ask for pain interventions or not. Patient given an Epidural consent to review prior to difficult labor.     Light breakfast ordered from the [facility] kitchen.    Patient/FOB deny questions/concerns regarding care since arrival to [facility].   RN contact information updated on the dry erase board, discussed.   Patient encouraged to call RN with all questions/concerns/needs.    1900 - Report to OMRIS CHAUHAN

## 2021-07-03 NOTE — CARE PLAN
Problem: Knowledge Deficit - L&D  Goal: Patient and family/caregivers will demonstrate understanding of plan of care, disease process/condition, diagnostic tests and medications  Outcome: Progressing     Problem: Risk for Infection and Impaired Wound Healing  Goal: Patient will remain free from infection  Outcome: Progressing     Problem: Risk for Injury  Goal: Patient and fetus will be free of preventable injury/complications  Outcome: Progressing     Problem: Pain  Goal: Patient's pain will be alleviated or reduced to the patient’s comfort goal  Outcome: Progressing

## 2021-07-03 NOTE — H&P
DATE OF ADMISSION:  2021     CHIEF COMPLAINT:  Induction of labor.     HISTORY OF PRESENT ILLNESS:  This is a 20-year-old female  1, para 0 at   37 weeks' gestational age.  She is being admitted to induce labor given the   history of preeclampsia with the proteinuria amount of 800 mg.  Outside of   this, her pregnancy has been uncomplicated.     PAST MEDICAL, SURGICAL, AND SOCIAL HISTORY:  Negative.     ALLERGIES:  She has no known drug allergies.     CURRENT MEDICATIONS:  Include a prenatal vitamin.     LABORATORY DATA:  Show a blood type of O positive, rubella immune.  Her group   B strep culture is negative.     PHYSICAL EXAMINATION:    VITAL SIGNS:  Stable.  Blood pressures have all been within normal limits in   the 130s/80s range.  CARDIOVASCULAR:  Regular rate and rhythm.  LUNGS:  Clear.  ABDOMEN:  Gravid.  PELVIC:  Currently is 3-4 cm, 70% effaced, -2 station.  Fetal heart tones are   140, and category 1 reactive.  Tocometry is irregular.  EXTREMITIES:  Show 1+ bilateral pedal edema.     ASSESSMENT AND PLAN:  This is a 20-year-old  1, para 0 at 37 weeks'   gestational age.  Preeclampsia based on protein criteria of 800 mg of protein.    She was admitted to the hospital last night and received 1 dose of Cytotec.    I just performed artificial rupture of membranes with clear fluid.  An   intrauterine pressure catheter was placed and Pitocin will now be started.        ______________________________  Corinne E. Capurro, MD CEC/LORRAINE    DD:  2021 09:53  DT:  2021 10:13    Job#:  [id number]

## 2021-07-03 NOTE — ANESTHESIA PREPROCEDURE EVALUATION
G1 @ 37 weeks, IUP, Nevarez  Preeclampsia    Relevant Problems   No relevant active problems       Physical Exam    Airway   Mallampati: II  TM distance: >3 FB  Neck ROM: full       Cardiovascular - normal exam  Rhythm: regular  Rate: normal  (-) murmur     Dental - normal exam           Pulmonary - normal exam  Breath sounds clear to auscultation     Abdominal    Neurological - normal exam                 Anesthesia Plan    ASA 2       Plan - epidural   Neuraxial block will be labor analgesia                  Pertinent diagnostic labs and testing reviewed    Informed Consent:    Anesthetic plan and risks discussed with patient.

## 2021-07-03 NOTE — CARE PLAN
Problem: Risk for Injury  Goal: Patient and fetus will be free of preventable injury/complications  Outcome: Progressing  Note: RN will apply EFM and TOCO, will continuously monitor electronic tracing.     Problem: Pain  Goal: Patient's pain will be alleviated or reduced to the patient’s comfort goal  Outcome: Progressing  Note: RN will assess pain. Pt knows to ask for pain medication if desired.

## 2021-07-03 NOTE — PROGRESS NOTES
0215 Patient arrived for her induction PIH,    0330 Dr. Gupta notified patient is here for her induction of labor due to PIH, no orders in chart, admission orders received, per the patient Dr. Conner stated it would be a cytotec induction of labor. Orders received for cytotec 25mcg per protocol.   0425 Cytotec placed, patient tolerated well.   0700 Report given to Roxanna SUBRAMANIAN, plan of care discussed, assuming care, denies needs at this time.

## 2021-07-03 NOTE — ANESTHESIA PROCEDURE NOTES
Epidural Block    Date/Time: 7/3/2021 1:38 PM  Performed by: Jeevan Farnkel D.O.  Authorized by: Jeevan Frankel D.O.     Patient Location:  OB  Start Time:  7/3/2021 1:38 PM  End Time:  7/3/2021 1:42 PM  Reason for Block: labor analgesia    patient identified, IV checked, site marked, risks and benefits discussed, surgical consent, monitors and equipment checked, pre-op evaluation and timeout performed    Patient Position:  Sitting  Prep: ChloraPrep, patient draped and sterile technique    Monitoring:  Blood pressure, continuous pulse oximetry and heart rate  Approach:  Midline  Location:  L3-L4  Injection Technique:  GEETA air  Skin infiltration:  Lidocaine  Strength:  1%  Dose:  3ml  Needle Type:  Tuohy  Needle Gauge:  17 G  Needle Length:  3.5 in  Loss of resistance::  5  Catheter Size:  19 G  Catheter at Skin Depth:  9  Test Dose Result:  Negative

## 2021-07-04 LAB
ERYTHROCYTE [DISTWIDTH] IN BLOOD BY AUTOMATED COUNT: 63 FL (ref 35.9–50)
HCT VFR BLD AUTO: 28.2 % (ref 37–47)
HGB BLD-MCNC: 8.1 G/DL (ref 12–16)
MCH RBC QN AUTO: 18.9 PG (ref 27–33)
MCHC RBC AUTO-ENTMCNC: 28.7 G/DL (ref 33.6–35)
MCV RBC AUTO: 65.9 FL (ref 81.4–97.8)
PLATELET # BLD AUTO: 222 K/UL (ref 164–446)
RBC # BLD AUTO: 4.28 M/UL (ref 4.2–5.4)
WBC # BLD AUTO: 18 K/UL (ref 4.8–10.8)

## 2021-07-04 PROCEDURE — 700102 HCHG RX REV CODE 250 W/ 637 OVERRIDE(OP): Performed by: OBSTETRICS & GYNECOLOGY

## 2021-07-04 PROCEDURE — 770002 HCHG ROOM/CARE - OB PRIVATE (112)

## 2021-07-04 PROCEDURE — 85027 COMPLETE CBC AUTOMATED: CPT

## 2021-07-04 PROCEDURE — 36415 COLL VENOUS BLD VENIPUNCTURE: CPT

## 2021-07-04 PROCEDURE — A9270 NON-COVERED ITEM OR SERVICE: HCPCS | Performed by: OBSTETRICS & GYNECOLOGY

## 2021-07-04 RX ORDER — IBUPROFEN 600 MG/1
600 TABLET ORAL EVERY 6 HOURS PRN
Qty: 30 TABLET | Refills: 1 | Status: SHIPPED | OUTPATIENT
Start: 2021-07-04 | End: 2021-08-21

## 2021-07-04 RX ADMIN — IBUPROFEN 600 MG: 600 TABLET, FILM COATED ORAL at 22:55

## 2021-07-04 ASSESSMENT — PATIENT HEALTH QUESTIONNAIRE - PHQ9
2. FEELING DOWN, DEPRESSED, IRRITABLE, OR HOPELESS: NOT AT ALL
SUM OF ALL RESPONSES TO PHQ9 QUESTIONS 1 AND 2: 0
1. LITTLE INTEREST OR PLEASURE IN DOING THINGS: NOT AT ALL

## 2021-07-04 ASSESSMENT — PAIN DESCRIPTION - PAIN TYPE
TYPE: ACUTE PAIN

## 2021-07-04 NOTE — PROGRESS NOTES
1412- Notified Dr. Corado that infant will need to stay until tomorrow per the pediatrician.  Orders to discontinue discharge given.

## 2021-07-04 NOTE — ANESTHESIA POSTPROCEDURE EVALUATION
Patient: Porfirio Keenan    Procedure Summary     Date: 07/03/21 Room / Location:     Anesthesia Start: 1332 Anesthesia Stop: 2038    Procedure: Labor Epidural Diagnosis:     Scheduled Providers:  Responsible Provider: Jeevan Frankel D.O.    Anesthesia Type: epidural ASA Status: 2          Final Anesthesia Type: epidural  Last vitals  BP   Blood Pressure: 129/71    Temp   36.7 °C (98.1 °F)    Pulse   (!) 107   Resp   16    SpO2   98 %      Anesthesia Post Evaluation    Patient location during evaluation: floor  Patient participation: complete - patient participated  Level of consciousness: awake and alert  Pain score: 4    Airway patency: patent  Anesthetic complications: no  Cardiovascular status: hemodynamically stable  Respiratory status: acceptable  Hydration status: euvolemic    PONV: none          No complications documented.     Nurse Pain Score: 6 (NPRS)

## 2021-07-04 NOTE — PROGRESS NOTES
0700-- Received report from MORIS Ambrose, Infant at bedside in open crib no signs of distress.  Pt resting in bed. Discussed pain management for the day.  No further needs at the time.  Call light within reach, bed locked and in lowest position.  Rounding in place.    0800-- Assessment completed, VSS, Pt declines PRN pain medication at this time.  Discussed plan of care for the day that pt is comfortable with.  All questions answered at this time.  Will continue to monitor.

## 2021-07-04 NOTE — LACTATION NOTE
Attempted to meet with MOB.  MOB in shower.  Will attempt to meet with MOB at a later time today.    1545- Attempted to meet with MOB.  MOB observed resting in bed with eyes closed and on signs of distress present.  RN, Adri Mondragon, notified that Lactation will follow up with MOB in the morning when she is awake.

## 2021-07-04 NOTE — CONSULTS
Met with MOB for an initial lactation visit.  MOB delivered her first baby yesterday, 07/03/21, at 2028 at 37 weeks gestation.  Risk factor for breastfeeding is: pre-eclampsia with this pregnancy.  MOB requested breastfeeding assistance with latch.    Assisted MOB with positioning infant in the cross cradle and football hold positions at the left breast.  Demonstrated positioning and encouraged MOB to position infant nipple to nose.  Infant observed latching onto the breast, but then stopping and falling off of the breast.  He would become fussy and then try to latch again.  This pattern ensued in both positions at the breast and latch attempt halted after 15 minutes.    Demonstrated hand massage, hand expression, and spoon feeding to MOB.  MOB was able to perform a return demonstration at the left breast.  However, she is still working on keeping her hand positioned around the border of the areola when hand expression colostrum instead of bringing her hand down towards the nipple.  This LC and MOB collected approximately 3 ml of colostrum which was then spoon fed back to infant immediately afterwards.  Infant took in all of the colostrum without any signs of distress observed.    JOLYNN stated she has [facility] and is seen at the office in [place], [place].  She has already been in contact with the lactation peer counselor, Dariela, from [facility].    Breastfeeding education provided on: cluster feeding, hunger cues, milk production, and pacifier use.    Breastfeeding Plan:  Offer infant the breast per feeding cues for a minimum of 8 or more feeds in a 24 hour period.  If infant is unable to latch onto the breast between now and when infant turns 24 hours old, MOB should be encouraged to hand express colostrum onto a spoon and feed back expressed breast milk to infant.  If infant continues to have difficulty latching onto the breast when infant turns 24 hours old, three step feeding plan should be implemented to include: breastfeeding,  supplementation per hospital guidelines, and pumping.    MOB was encouraged to do as much skin to skin with infant as possible.    MOB verbalized understanding of all information provided to her and denied having any further questions at this time.  Encouraged MOB to call for lactation assistance as needed.

## 2021-07-04 NOTE — L&D DELIVERY NOTE
DATE OF SERVICE:  07/03/2021     The patient underwent normal spontaneous vaginal delivery of a viable male   infant over intact perineum with epidural placement.  She was induced at 37   weeks secondary to preeclampsia by proteinuria based off of a urine with 800   mg of protein.  She underwent normal spontaneous vaginal delivery of the head   in controlled sterile fashion.  The rest of infant delivered through   uncomplicated.  Cord was held for 30 seconds, then clamped x2 and cut and   infant was placed on mother's chest.  Spontaneous vaginal delivery of intact   placenta with 3-vessel cord.  A second-degree perineal laceration was noted   and this was repaired using 3-0 Vicryl.  Total estimated blood loss was   approximately 300 mL. Apgars of the infant were 8 and 9.  Mother and infant   were both stable at the end of delivery.        ______________________________  Corinne E. Capurro, MD [facility]/ABE/EZEKIEL    DD:  07/03/2021 20:54  DT:  07/03/2021 21:11    Job#:  [id number]

## 2021-07-04 NOTE — PROGRESS NOTES
Patient admitted to unit to room 312 from L&D. Received report from Ayanna SUBRAMANIAN. Assumed care of patient. Assessment complete. Fundus is firm, at the umbilicus, with light lochia rubra. Pt verbalized that she is in 4/10 pain and had received motrin downstairs. patient verbalized that she will call for medication on an as needed basis - PRN meds and frequency discussed. Patient and FOB oriented to room and procedures, emergency light, call bell, infant I&O sheet, infant sleep safety, identification badges, and to call for assistance to bathroom. ID bands verified with L&D RN, cuddles on an blinking. Patient and FOB verbalized understanding, all questions addressed and answered. Bed is locked and in low position. Call light left within reach and encouraged to call for any needs if necessary.

## 2021-07-04 NOTE — PROGRESS NOTES
1900 - Assumed care of pt, report received. Pitocin turned off, pt marivel every 1-2 min.   1917 - Pt feeling increased pain and pressure. SVE:C/+1. Dr. Becerra called and updated. RN to begin pushing with pt.   1923 - Pt began pushing.   2028 - Del of viable infant male. Apgars 8/9.   2315 - Pt up to bathroom, able to void, steady. Pt taken up to postpartum. Report given to Halie SUBRAMANIAN

## 2021-07-04 NOTE — PROGRESS NOTES
PPD#1  S) pt without c/o  O) vss  Fundus; firm  Ext; no edema  Hgb: pending (prior 9.0)  A/P PPD#1, s/p  at 37wks for pre-e ( proteinuria )   - pt desires d/c home this evening if baby can be d/c

## 2021-07-04 NOTE — ANESTHESIA TIME REPORT
Anesthesia Start and Stop Event Times     Date Time Event    7/3/2021 1323 Ready for Procedure     1332 Anesthesia Start     2038 Anesthesia Stop        Responsible Staff  07/03/21    Name Role Begin End    Jeevan Frankel D.O. Anesth 1332 2038        Preop Diagnosis (Free Text):  Pre-op Diagnosis             Preop Diagnosis (Codes):    Post op Diagnosis  Normal delivery      Premium Reason  E. Weekend    Comments:

## 2021-07-04 NOTE — CARE PLAN
The patient is Stable - Low risk of patient condition declining or worsening    Shift Goals  Clinical Goals: pain control, rest, bonding, vitals WNL    Progress made toward(s) clinical / shift goals:  pain controlled with PRN meds, pt wanting to rest, encouraged bonding    Patient is not progressing towards the following goals:      Problem: Risk for Infection and Impaired Wound Healing  Goal: Patient will remain free from infection  Note: No s/s of infection present.      Problem: Altered Physiologic Condition  Goal: Patient physiologically stable as evidenced by normal lochia, palpable uterine involution and vitals within normal limits  Outcome: Progressing  Note: Fundus firm, lochia light and rubra. No clots. Vitals stable. Encouraged to call for increased bleeding. Fundal checks in place.

## 2021-07-05 VITALS
BODY MASS INDEX: 31.55 KG/M2 | DIASTOLIC BLOOD PRESSURE: 71 MMHG | TEMPERATURE: 97 F | OXYGEN SATURATION: 97 % | RESPIRATION RATE: 16 BRPM | SYSTOLIC BLOOD PRESSURE: 112 MMHG | WEIGHT: 201 LBS | HEIGHT: 67 IN | HEART RATE: 81 BPM

## 2021-07-05 PROCEDURE — A9270 NON-COVERED ITEM OR SERVICE: HCPCS | Performed by: OBSTETRICS & GYNECOLOGY

## 2021-07-05 PROCEDURE — 700102 HCHG RX REV CODE 250 W/ 637 OVERRIDE(OP): Performed by: OBSTETRICS & GYNECOLOGY

## 2021-07-05 RX ADMIN — IBUPROFEN 600 MG: 600 TABLET, FILM COATED ORAL at 05:19

## 2021-07-05 ASSESSMENT — EDINBURGH POSTNATAL DEPRESSION SCALE (EPDS)
I HAVE LOOKED FORWARD WITH ENJOYMENT TO THINGS: AS MUCH AS I EVER DID
I HAVE BEEN ABLE TO LAUGH AND SEE THE FUNNY SIDE OF THINGS: AS MUCH AS I ALWAYS COULD
THE THOUGHT OF HARMING MYSELF HAS OCCURRED TO ME: NEVER
I HAVE FELT SAD OR MISERABLE: NO, NOT AT ALL
I HAVE FELT SCARED OR PANICKY FOR NO GOOD REASON: NO, NOT AT ALL
I HAVE BEEN ANXIOUS OR WORRIED FOR NO GOOD REASON: HARDLY EVER
I HAVE BEEN SO UNHAPPY THAT I HAVE BEEN CRYING: NO, NEVER
THINGS HAVE BEEN GETTING ON TOP OF ME: NO, I HAVE BEEN COPING AS WELL AS EVER
I HAVE BLAMED MYSELF UNNECESSARILY WHEN THINGS WENT WRONG: YES, SOME OF THE TIME
I HAVE BEEN SO UNHAPPY THAT I HAVE HAD DIFFICULTY SLEEPING: NOT AT ALL

## 2021-07-05 ASSESSMENT — PAIN DESCRIPTION - PAIN TYPE: TYPE: ACUTE PAIN

## 2021-07-05 NOTE — PROGRESS NOTES
"Assumed care of patient. Assessment complete. Fundus is firm, one below the umbilicus, with light lochia rubra. Pain level is 6/10, pt would like to attempt to feed infant first and then would like pain medication. Infant at this time in FOBs arms, was a bit fussy prior to RN arrival. Per MOB, infant has not latched much throughout the day as he still is sleepy at times and/or infant will become very fussy and frantic at the breast and is unable to latch due to those reasons. RN attempted with helping infant latch to breast. He opens up wide at times but is quick to \"bite down\" and will not suckle for very long. Per MOB it feels as though he \"chomps down\". Infant became fussy with each attempt to latch, this continued throughout the feed. This RN attempted both cross cradle positions and football hold, however infant did not tolerate very well. Hand expression was also performed, MOB did have drops from right side and was placed on infants lips. At this time, decision made to stop the latching process and begin feeding plan. RN demonstrated to parents how to pace feed infant with Similac formula - per parents choosing. Infant able to take 15 ml of similac, tolerated well and infant able to burp after feeding. Infant given to FOB while this RN set mom up with pumping supplies. Settings reviewed, MOB verbalized understanding. Some pain/tenderness with pumping, suction brought down to 15% and MOB stated it felt better. Bed is locked and in low position. Call light left within reach and encouraged to call for any needs if necessary.   "

## 2021-07-05 NOTE — PROGRESS NOTES
"PPD#2     S: Doing well. Pain controlled with motrin alone. Lochia normal. Denies HA/RUQ pain/scotoma. Feels ready to go home.     O: /71   Pulse 81   Temp 36.1 °C (97 °F) (Temporal)   Resp 16   Ht 1.702 m (5' 7.01\")   Wt 91.2 kg (201 lb)   SpO2 97%   Gen: NAD  Fundus  Ext:     Labs: O+, RI, GBS neg  Recent Labs     21  0355 21  0546   WBC 9.5 18.0*   RBC 4.85 4.28   HEMOGLOBIN 9.0* 8.1*   HEMATOCRIT 33.2* 28.2*   MCV 68.5* 65.9*   MCH 18.6* 18.9*   RDW 65.2* 63.0*   PLATELETCT 285 222   NEUTSPOLYS 62.30  --    LYMPHOCYTES 28.10  --    MONOCYTES 8.20  --    EOSINOPHILS 0.80  --    BASOPHILS 0.20  --    RBCMORPHOLO Present  --      A/P 19yo  s/p , PreE without severe features  1. Routine pp care  2. PreE -Clinically appears resolved    -BP meeting criteria without medication    -Precautions given  3. Anemia -Fe deficiency, present on admission.     -Worsened with acute blood loss    -Asymptomatic  4. Home today   "

## 2021-07-05 NOTE — PROGRESS NOTES
Discharged home with baby. Ambulated to car with steady gait . Instructions given on infant care and safety , self care, and reasons to call the

## 2021-07-05 NOTE — CARE PLAN
The patient is Stable - Low risk of patient condition declining or worsening    Shift Goals  Clinical Goals: pain control, rest, bonding, vitals WNL    Progress made toward(s) clinical / shift goals:  Pain has been controled throughout shift today.  Pt is bonding and caring for infant.     Patient is not progressing towards the following goals: N/A

## 2021-07-05 NOTE — DISCHARGE INSTRUCTIONS
PATIENT DISCHARGE EDUCATION INSTRUCTION SHEET  REASONS TO CALL YOUR PEDIATRICIAN  · Projectile or forceful vomiting for more than one feeding  · Unusual rash lasting more than 24 hours  · Very sleepy, difficult to wake up  · Bright yellow or pumpkin colored skin with extreme sleepiness  · Temperature below 97.6 or above 100.4 F rectally  · Feeding problems  · Breathing problems  · Excessive crying with no known cause  · If cord starts to become red, swollen, develops a smell or discharge  · No wet diaper or stool in a 24 hour time period     REASONS TO CALL YOUR OBSTETRICIAN  · Persistent fever, shaking, chills (Temperature higher than 100.4) may indicate you have an infection  · Heavy bleeding: soaking more than 1 pad per hour; Passing clots an egg-sized clot or bigger may mean you have an postpartum hemorrhage  · Foul odor from vagina or bad smelling or discolored discharge or blood  · Breast infection (Mastitis symptoms); breast pain, chills, fever, redness or red streaks, may feel flu like symptoms  · Urinary pain, burning or frequency  · Incision that is not healing, increased redness, swelling, tenderness or pain, or any pus from episiotomy or  site may mean you have an infection  · Redness, swelling, warmth, or painful to touch in the calf area of your leg may mean you have a blood clot  · Severe or intensified depression, thoughts or feelings of wanting to hurt yourself or someone else   · Pain in chest, obstructed breathing or shortness of breath (trouble catching your breath) may mean you are having a postpartum complication. Call your provider immediately   · Headache that does not get better, even after taking medicine, a bad headache with vision changes or pain in the upper right area of your belly may mean you have high blood pressure or post birth preeclampsia. Call your provider immediately    SAFE SLEEP POSITIONING FOR YOUR BABY  The American Academy [employer] Pediatric[employer] advises your baby should  be placed on his/her back for Sleeping to reduce the risk of Sudden Infant Death Syndrome (SIDS)  · Baby should sleep by themselves in a crib, portable crib or bassinet  · Baby should not share a bed with his/her parents  · Baby should be placed on his or her back to sleep, night time and at naps  · Baby should sleep on firm mattress with a tightly fitted sheet  · NO couches, waterbeds or anything soft  · Baby's sleep area should not contain any loose blankets, comforters, stuffed animals or any other soft items, (pillows, bumper pads, etc. ...)  · Baby's face should be kept uncovered at all times  · Baby should sleep in a smoke-free environment  · Do not dress baby too warmly to prevent overheating    HAND WASHING  All family and friends should wash their hands:  · Before and after holding the baby  · Before feeding the baby  · After using the restroom or changing the baby's diaper     CARE    TAKING BABY'S TEMPERATURE  · If you feel your baby may have a fever take your baby's temperature per thermometer instructions  · If taking axillary temperature place thermometer under baby's armpit and hold arm close to body  · The most precise and accurate way to take a temperature is rectally  · Turn on the digital thermometer and lubricate the tip of the thermometer with petroleum jelly.  · Lay your baby or child on his or her back, lift his or her thighs, and insert the lubricated thermometer 1/2 to 1 inch (1.3 to 2.5 centimeters) into the rectum  · Call your Pediatrician for temperature lower than 97.6 or greater than 100.4 F rectally    BATHE AND SHAMPOO BABY  · Gently wash baby with a soft cloth using warm water and mild soap - rinse well  · Do not put baby in tub bath until umbilical cord falls off and appears well-healed  · Bathing baby 2-3 times a week might be enough until your baby becomes more mobile. Bathing your baby too much can dry out his or her skin     NAIL CARE  · First recommendation is to keep  them covered to prevent facial scratching  · During the first few weeks,  nails are very soft. Doctors recommend using only a fine emery board. Don't bite or tear your baby's nails. When your baby's nails are stronger, after a few weeks, you can switch to clippers or scissors making sure not to cut too short and nip the quick   · A good time for nail care is while your baby is sleeping and moving less    CORD CARE  · Fold diaper below umbilical cord until cord falls off  · Keep umbilical cord clean and dry  · May see a small amount of crust around the base of the cord. Clean off with mild soap and water and dry             DIAPER AND DRESS BABY  · For baby girls: gently wipe from front to back. Mucous or pink tinged drainage is normal  · For uncircumcised baby boys: do NOT pull back the foreskin to clean the penis. Gently clean with wipes or warm, soapy water  · Dress baby in one more layer of clothing than you are wearing  · Use a hat to protect from sun or cold. NO ties or drawstrings    URINATION AND BOWEL MOVEMENTS  · If formula feeding or when breast milk feeding is established, your baby should wet 6-8 diapers a day and have at least 2 bowel movements a day during the first month  · Bowel movements color and type can vary from day to day    CIRCUMCISION  What to watch out for:  · Foul smelling discharge  · Fever  · Swelling   · Crusty, fluid filled sores  · Trouble urinating   · Persistent bleeding or more than a quarter size spot of blood on his diaper  · Yellow discharge lasting more than a week  · Continue with care procedures until healed or have a visit with your Pediatrician     INFANT FEEDING  · Most newborns feed 8-12 times, every 24 hours. YOU MAY NEED TO WAKE YOUR BABY UP TO FEED  · If breastfeeding, offer both breasts when your baby is showing feeding cues, such as rooting or bringing hand to mouth and sucking  · Common for  babies to feed every 1-3 hours   · Only allow baby to sleep  up to 4 hours in between feeds if baby is feeding well at each feed. Offer breast anytime baby is showing feeding cues and at least every 3 hours  · Follow up with outpatient [facility] for continued breast feeding support    FORMULA FEEDING  · Feed baby formula every 2-3 hours when your baby is showing feeding cues  · Paced bottle feeding will help baby not over eat at each feed     BOTTLE FEEDING   · Paced Bottle Feeding is a method of bottle feeding that allows the infant to be more in control of the feeding pace. This feeding method slows down the flow of milk into the nipple and the mouth, allowing the baby to eat more slowly, and take breaks. Paced feeding reduces the risk of overfeeding that may result in discomfort for the baby   · Hold baby almost upright or slightly reclined position supporting the head and neck  · Use a small nipple for slow-flowing. Slow flow nipple holes help in controlling flow   · Don't force the bottle's nipple into your baby's mouth. Tickle babies lip so baby opens their mouth  · Insert nipple and hold the bottle flat  · Let the baby suck three to four times without milk then tip the bottle just enough to fill the nipple about residential with milk  · Let baby suck 3-5 continuous swallows, about 20-30 seconds tip the bottle down to give the baby a break  · After a few seconds, when the baby begins to suck again, tip bottle up to allow milk to flow into the nipple  · Continue to Pace feed until baby shows signs of fullness; no longer sucking after a break, turning away or pushing away the nipple   · Bottle propping is not a recommended practice for feeding  · Bottle propping is when you give a baby a bottle by leaning the bottle against a pillow, or other support, rather than holding the baby and the bottle.  · Forces your baby to keep up with the flow, even if the baby is full   · This can increase your baby's risk of choking, ear infections, and tooth decay    BOTTLE  "PREPARATION   · Never feed  formula to your baby, or use formula if the container is dented  · When using ready-to-feed, shake formula containers before opening  · If formula is in a can, clean the lid of any dust, and be sure the can opener is clean  · Formula does not need to be warmed. If you choose to feed warmed formula, do not microwave it. This can cause \"hot spots\" that could burn your baby. Instead, set the filled bottle in a bowl of warm (not boiling) water or hold the bottle under warm tap water. Sprinkle a few drops of formula on the inside of your wrist to make sure it's not too hot  · Measure and pour desired amount of water into baby bottle  · Add unpacked, level scoop(s) of powder to the bottle as directed on formula container. Return dry scoop to can  · Put the cap on the bottle and shake. Move your wrist in a twisting motion helps powder formula mix more quickly and more thoroughly  · Feed or store immediately in refrigerator  · You need to sterilize bottles, nipples, rings, etc., only before the first use    CLEANING BOTTLE  · Use hot, soapy water  · Rinse the bottles and attachments separately and clean with a bottle brush  · If your bottles are labelled  safe, you can alternatively go ahead and wash them in the    · After washing, rinse the bottle parts thoroughly in hot running water to remove any bubbles or soap residue   · Place the parts on a bottle drying rack   · Make sure the bottles are left to drain in a well-ventilated location to ensure that they dry thoroughly  CAR SEAT  For your baby's safety and to comply with [facility] Law you will need to bring a car seat to the hospital before taking your baby home. Please read your car seat instructions before your baby's discharge from the hospital.  · Make sure you place an emergency contact sticker on your baby's car seat with your baby's identifying information  · Car seat should not be placed in the front seat " of a vehicle. The car seat should be placed in the back seat in the rear-facing position.  · Car seat information is available through [employer] at [phone] and also at [email or web address]    MATERNAL CARE     WOUND CARE  Ask your physician for additional care instructions. In general:  ·  Incision:  · May shower and pat incision dry   · Keep the incision clean and dry  · There should not be any opening or pus from the incision  · Continue to walk at home 3 times a day   · Do NOT lift anything heavier than your baby (over 10 pounds)  · Encourage family to help participate in care of the  to allow rest and mom time to heal    · Episiotomy/Laceration  · May use sadie-spray bottle, witch hazel pads and dermaplast spray for comfort  · Use sadie-spray bottle after urinating to cleanse perineal area  · To prevent burning during urination spray sadie-water bottle on labial area   · Pat perineal area dry until episiotomy/laceration is healed  · Continue to use sadie-bottle until bleeding stops as needed  · If have a 2nd degree laceration or greater, a Sitz bath can offer relief from soreness, burning, and inflammation   · Sitz Bath   · Sit in 6 inches of warm water and soak laceration as needed until the laceration heals    VAGINAL CARE AND BLEEDING  · Nothing inside vagina for 6 weeks:   · No sexual intercourse, tampons or douching  · Bleeding may continue for 2-4 weeks. Amount and color may vary  · Soaking 1 pad or more in an hour for several hours is considered heavy bleeding  · Passing large egg sized blood clots can be concerning  · If you feel like you have heavy bleeding or are having increasing amount of blood clots call your Obstetrician immediately  · If you begin feeling faint upon standing, feeling sick to your stomach, have clammy skin, a really fast heartbeat, have chills, start feeling confused, dizzy, sleepy or weak, or feeling like you're going to faint call your Obstetrician  "immediately    HYPERTENSION   Preeclampsia or gestational hypertension are types of high blood pressure that only pregnant women can get. It is important for you to be aware of symptoms to seek early intervention and treatment. If you have any of these symptoms immediately call your Obstetrician    · Vision changes or blurred vision   · Severe headache or pain that is unrelieved with medication and will not go away  · Persistent pain in upper abdomen or shoulder   · Increased swelling of face, feet, or hands  · Difficulty breathing or shortness of breath at rest  · Urinating less than usual    URINATION AND BOWEL MOVEMENTS  · Eating more fiber (bran cereal, fruits, and vegetables) and drinking plenty of fluids will help to avoid constipation  · Urinary frequency and urgency after childbirth is normal  · If you experience any urinary pain, burning or frequency call your provider    BIRTH CONTROL  · It is possible to become pregnant at any time after delivery and while breastfeeding  · Plan to discuss a method of birth control with your physician at your post-delivery follow up visit    POSTPARTUM BLUES  During the first few days after birth, you may experience a sense of the \"blues\" which may include impatience, irritability or even crying. These feelings come and go quickly. However, as many as 1 in 10 women experience emotional symptoms known as postpartum depression.     POSTPARTUM DEPRESSION    May start as early as the second or third day after delivery or take several weeks or months to develop. Symptoms of \"blues\" are present, but are more intense: Crying spells; loss of appetite; feelings of hopelessness or loss of control; fear of touching the baby; over concern or no concern at all about the baby; little or no concern about your own appearance/caring for yourself; and/or inability to sleep or excessive sleeping. Contact your Obstetrician if you are experiencing any of these symptoms     PREVENTING SHAKEN " "BABY  If you are angry or stressed, PUT THE BABY IN THE CRIB, step away, take some deep breaths, and wait until you are calm to care for the baby. DO NOT SHAKE THE BABY. You are not alone, call a supporter for help.  · [facility] 24/7 crisis call line (174-410-7407) or (1-598.350.1267)  · You can also text them, text \"ANSWER\" (034797)      "

## 2021-07-05 NOTE — LACTATION NOTE
This note was copied from a baby's chart.  Follow up visit, mom breastfeeding baby upon arrival, holding baby in cross cradle position and good latch and suck observed on left breast, mom denies pain with breastfeeding and states that baby has been latched and sucking well since 8:10 this morning. Assisted with latching baby to right breast, baby latched well with minimal assist and mom educated on proper latch and feeding cues. Last night baby was sleepy and not latching well, baby was supplemented with Similac for 4 feeds  and mother pumped once This morning baby is alert and latching well and engaged in feeding, discussed with mom that if baby continues to feed well, that supplementation will not be necessary.  Discussed nutritive and non-nutritive feeding and expected diaper output.      Discussed frequency of feedings and the need for baby to breastfeed at least 8 times in a 24 hour time frame, educated on hunger cues and cluster feeding, onset of lactogenesis and need to avoid pacifiers.  Plan discussed with mom that in the event feedings become sub-optimal (sleepy, non sustained latch for at least 10 minutes, not feeding within a 3 hour window), she can continue with supplementing formula and using breast pump.    MOB states that she is established with [facility] and will follow up tomorrow for pump inquiry and breastfeeding support if needed, hand pump provided and demonstrated proper use and care.    Ira Aaron RN, IBCLC, orientee

## 2021-07-05 NOTE — CARE PLAN
The patient is Stable - Low risk of patient condition declining or worsening    Shift Goals  Clinical Goals: pain control, breastfeeding, vitals WNL    Progress made toward(s) clinical / shift goals:  pain tolerable, at times requiring PRN meds, vitals stable. On feeding plan .     Patient is not progressing towards the following goals:      Problem: Altered Physiologic Condition  Goal: Patient physiologically stable as evidenced by normal lochia, palpable uterine involution and vitals within normal limits  Outcome: Progressing  Note: Fundus firm, lochia light and rubra. No clots. Vitals stable.      Problem: Infection - Postpartum  Goal: Postpartum patient will be free of signs and symptoms of infection  Outcome: Progressing  Note: No s/s of infection present on assessment.

## 2021-07-05 NOTE — CARE PLAN
The patient is Stable - Low risk of patient condition declining or worsening    Shift Goals  Clinical Goals: self care, take care of baby [id number]    Progress made toward(s) clinical / shift goals:  taking care  of baby independently. Lochia light. Denies needs    Patient is not progressing towards the following goals:

## 2021-08-21 ENCOUNTER — HOSPITAL ENCOUNTER (EMERGENCY)
Facility: MEDICAL CENTER | Age: 21
End: 2021-08-21
Attending: EMERGENCY MEDICINE
Payer: MEDICAID

## 2021-08-21 ENCOUNTER — APPOINTMENT (OUTPATIENT)
Dept: RADIOLOGY | Facility: MEDICAL CENTER | Age: 21
End: 2021-08-21
Attending: EMERGENCY MEDICINE
Payer: MEDICAID

## 2021-08-21 VITALS
BODY MASS INDEX: 26.64 KG/M2 | HEIGHT: 67 IN | WEIGHT: 169.75 LBS | TEMPERATURE: 97.4 F | SYSTOLIC BLOOD PRESSURE: 103 MMHG | OXYGEN SATURATION: 96 % | HEART RATE: 95 BPM | RESPIRATION RATE: 20 BRPM | DIASTOLIC BLOOD PRESSURE: 54 MMHG

## 2021-08-21 DIAGNOSIS — N39.0 ACUTE UTI: ICD-10-CM

## 2021-08-21 LAB
ALBUMIN SERPL BCP-MCNC: 3.8 G/DL (ref 3.2–4.9)
ALBUMIN/GLOB SERPL: 1.1 G/DL
ALP SERPL-CCNC: 156 U/L (ref 30–99)
ALT SERPL-CCNC: 47 U/L (ref 2–50)
ANION GAP SERPL CALC-SCNC: 11 MMOL/L (ref 7–16)
ANISOCYTOSIS BLD QL SMEAR: ABNORMAL
APPEARANCE UR: ABNORMAL
AST SERPL-CCNC: 43 U/L (ref 12–45)
BACTERIA #/AREA URNS HPF: ABNORMAL /HPF
BASOPHILS # BLD AUTO: 0.3 % (ref 0–1.8)
BASOPHILS # BLD: 0.04 K/UL (ref 0–0.12)
BILIRUB SERPL-MCNC: 0.2 MG/DL (ref 0.1–1.5)
BILIRUB UR QL STRIP.AUTO: NEGATIVE
BUN SERPL-MCNC: 7 MG/DL (ref 8–22)
CALCIUM SERPL-MCNC: 9.2 MG/DL (ref 8.4–10.2)
CHLORIDE SERPL-SCNC: 105 MMOL/L (ref 96–112)
CO2 SERPL-SCNC: 23 MMOL/L (ref 20–33)
COLOR UR: YELLOW
COMMENT 1642: NORMAL
CREAT SERPL-MCNC: 0.52 MG/DL (ref 0.5–1.4)
DACRYOCYTES BLD QL SMEAR: NORMAL
EOSINOPHIL # BLD AUTO: 0.16 K/UL (ref 0–0.51)
EOSINOPHIL NFR BLD: 1.2 % (ref 0–6.9)
EPI CELLS #/AREA URNS HPF: ABNORMAL /HPF
ERYTHROCYTE [DISTWIDTH] IN BLOOD BY AUTOMATED COUNT: 58.9 FL (ref 35.9–50)
GLOBULIN SER CALC-MCNC: 3.6 G/DL (ref 1.9–3.5)
GLUCOSE SERPL-MCNC: 103 MG/DL (ref 65–99)
GLUCOSE UR STRIP.AUTO-MCNC: NEGATIVE MG/DL
HCG SERPL QL: NEGATIVE
HCT VFR BLD AUTO: 34.9 % (ref 37–47)
HGB BLD-MCNC: 10.2 G/DL (ref 12–16)
HYPOCHROMIA BLD QL SMEAR: ABNORMAL
IMM GRANULOCYTES # BLD AUTO: 0.06 K/UL (ref 0–0.11)
IMM GRANULOCYTES NFR BLD AUTO: 0.5 % (ref 0–0.9)
KETONES UR STRIP.AUTO-MCNC: NEGATIVE MG/DL
LEUKOCYTE ESTERASE UR QL STRIP.AUTO: ABNORMAL
LIPASE SERPL-CCNC: 25 U/L (ref 7–58)
LYMPHOCYTES # BLD AUTO: 2.96 K/UL (ref 1–4.8)
LYMPHOCYTES NFR BLD: 23 % (ref 22–41)
MCH RBC QN AUTO: 20.8 PG (ref 27–33)
MCHC RBC AUTO-ENTMCNC: 29.2 G/DL (ref 33.6–35)
MCV RBC AUTO: 71.2 FL (ref 81.4–97.8)
MICRO URNS: ABNORMAL
MICROCYTES BLD QL SMEAR: ABNORMAL
MONOCYTES # BLD AUTO: 0.86 K/UL (ref 0–0.85)
MONOCYTES NFR BLD AUTO: 6.7 % (ref 0–13.4)
MUCOUS THREADS #/AREA URNS HPF: ABNORMAL /HPF
NEUTROPHILS # BLD AUTO: 8.8 K/UL (ref 2–7.15)
NEUTROPHILS NFR BLD: 68.3 % (ref 44–72)
NITRITE UR QL STRIP.AUTO: NEGATIVE
NRBC # BLD AUTO: 0 K/UL
NRBC BLD-RTO: 0 /100 WBC
OVALOCYTES BLD QL SMEAR: NORMAL
PH UR STRIP.AUTO: 6.5 [PH] (ref 5–8)
PLATELET # BLD AUTO: 383 K/UL (ref 164–446)
PLATELET BLD QL SMEAR: NORMAL
POIKILOCYTOSIS BLD QL SMEAR: NORMAL
POLYCHROMASIA BLD QL SMEAR: NORMAL
POTASSIUM SERPL-SCNC: 4.1 MMOL/L (ref 3.6–5.5)
PROT SERPL-MCNC: 7.4 G/DL (ref 6–8.2)
PROT UR QL STRIP: 100 MG/DL
RBC # BLD AUTO: 4.9 M/UL (ref 4.2–5.4)
RBC # URNS HPF: ABNORMAL /HPF
RBC BLD AUTO: PRESENT
RBC UR QL AUTO: ABNORMAL
SODIUM SERPL-SCNC: 139 MMOL/L (ref 135–145)
SP GR UR STRIP.AUTO: 1.01
WBC # BLD AUTO: 12.9 K/UL (ref 4.8–10.8)
WBC #/AREA URNS HPF: ABNORMAL /HPF
YEAST #/AREA URNS HPF: ABNORMAL /HPF

## 2021-08-21 PROCEDURE — 74176 CT ABD & PELVIS W/O CONTRAST: CPT

## 2021-08-21 PROCEDURE — 87186 SC STD MICRODIL/AGAR DIL: CPT

## 2021-08-21 PROCEDURE — 87077 CULTURE AEROBIC IDENTIFY: CPT

## 2021-08-21 PROCEDURE — 700111 HCHG RX REV CODE 636 W/ 250 OVERRIDE (IP): Performed by: EMERGENCY MEDICINE

## 2021-08-21 PROCEDURE — 87086 URINE CULTURE/COLONY COUNT: CPT

## 2021-08-21 PROCEDURE — 80053 COMPREHEN METABOLIC PANEL: CPT

## 2021-08-21 PROCEDURE — 81001 URINALYSIS AUTO W/SCOPE: CPT

## 2021-08-21 PROCEDURE — 83690 ASSAY OF LIPASE: CPT

## 2021-08-21 PROCEDURE — 96374 THER/PROPH/DIAG INJ IV PUSH: CPT

## 2021-08-21 PROCEDURE — 36415 COLL VENOUS BLD VENIPUNCTURE: CPT

## 2021-08-21 PROCEDURE — 84703 CHORIONIC GONADOTROPIN ASSAY: CPT

## 2021-08-21 PROCEDURE — 85025 COMPLETE CBC W/AUTO DIFF WBC: CPT

## 2021-08-21 PROCEDURE — 99284 EMERGENCY DEPT VISIT MOD MDM: CPT

## 2021-08-21 PROCEDURE — 96375 TX/PRO/DX INJ NEW DRUG ADDON: CPT

## 2021-08-21 RX ORDER — ONDANSETRON 2 MG/ML
4 INJECTION INTRAMUSCULAR; INTRAVENOUS ONCE
Status: COMPLETED | OUTPATIENT
Start: 2021-08-21 | End: 2021-08-21

## 2021-08-21 RX ORDER — CEFDINIR 300 MG/1
300 CAPSULE ORAL 2 TIMES DAILY
Qty: 14 CAPSULE | Refills: 0 | Status: SHIPPED | OUTPATIENT
Start: 2021-08-21 | End: 2021-08-28

## 2021-08-21 RX ORDER — CEFTRIAXONE 1 G/1
1 INJECTION, POWDER, FOR SOLUTION INTRAMUSCULAR; INTRAVENOUS ONCE
Status: COMPLETED | OUTPATIENT
Start: 2021-08-21 | End: 2021-08-21

## 2021-08-21 RX ORDER — KETOROLAC TROMETHAMINE 30 MG/ML
15 INJECTION, SOLUTION INTRAMUSCULAR; INTRAVENOUS ONCE
Status: COMPLETED | OUTPATIENT
Start: 2021-08-21 | End: 2021-08-21

## 2021-08-21 RX ADMIN — KETOROLAC TROMETHAMINE 15 MG: 30 INJECTION, SOLUTION INTRAMUSCULAR; INTRAVENOUS at 16:30

## 2021-08-21 RX ADMIN — ONDANSETRON 4 MG: 2 INJECTION INTRAMUSCULAR; INTRAVENOUS at 16:31

## 2021-08-21 RX ADMIN — CEFTRIAXONE SODIUM 1 G: 1 INJECTION, POWDER, FOR SOLUTION INTRAMUSCULAR; INTRAVENOUS at 17:44

## 2021-08-21 ASSESSMENT — FIBROSIS 4 INDEX: FIB4 SCORE: 1.01

## 2021-08-21 ASSESSMENT — PAIN DESCRIPTION - DESCRIPTORS: DESCRIPTORS: ACHING

## 2021-08-21 NOTE — ED TRIAGE NOTES
"Presents complaining of acute onset of generalized mid to lower abdominal pain, referred to her right flank with associated nausea recurring for approximately two hours.  Chief Complaint   Patient presents with   • Abdominal Pain   • Nausea   • Flank Pain     /77   Pulse 90   Temp 36.3 °C (97.4 °F) (Temporal)   Resp 20   Ht 1.702 m (5' 7\")   Wt 77 kg (169 lb 12.1 oz)   LMP 10/17/2020   SpO2 96%   BMI 26.59 kg/m²             "

## 2021-08-21 NOTE — ED PROVIDER NOTES
ED Provider Note  CHIEF COMPLAINT  Chief Complaint   Patient presents with   • Abdominal Pain   • Nausea   • Flank Pain       HPI  Porfirio Keenan is a 21 y.o. female who presents with right flank pain.  Patient also is having bladder pain and nausea.  Symptoms started a couple days ago.  They have gradually worsened.  The pain radiates from the bladder around to the back area.  She has not had any vomiting or diarrhea.  She denies any fevers.  She denies any vaginal discharge or vaginal bleeding.  She has a history of a vaginal birth in July.  No complication related to that.  No recent surgeries.  No history of abdominal surgeries.  No cough or shortness of breath.  Denies current pregnancy.    REVIEW OF SYSTEMS  See HPI for further details. All other systems are negative.     PAST MEDICAL HISTORY  Past Medical History:   Diagnosis Date   • Anemia    • Breath shortness     r/t enlarged tonsil    • Cold     12/20/2017        FAMILY HISTORY  [unfilled]    SOCIAL HISTORY  Social History     Socioeconomic History   • Marital status: Single     Spouse name: Not on file   • Number of children: Not on file   • Years of education: Not on file   • Highest education level: Not on file   Occupational History   • Not on file   Tobacco Use   • Smoking status: Never Smoker   • Smokeless tobacco: Never Used   Vaping Use   • Vaping Use: Never used   Substance and Sexual Activity   • Alcohol use: No   • Drug use: No   • Sexual activity: Not on file   Other Topics Concern   • Not on file   Social History Narrative    ** Merged History Encounter **          Social Determinants of Health     Financial Resource Strain:    • Difficulty of Paying Living Expenses:    Food Insecurity:    • Worried About Running Out of Food in the Last Year:    • Ran Out of Food in the Last Year:    Transportation Needs:    • Lack of Transportation (Medical):    • Lack of Transportation (Non-Medical):    Physical Activity:    • Days of Exercise per Week:    •  "Minutes of Exercise per Session:    Stress:    • Feeling of Stress :    Social Connections:    • Frequency of Communication with Friends and Family:    • Frequency of Social Gatherings with Friends and Family:    • Attends Oriental orthodox Services:    • Active Member of Clubs or Organizations:    • Attends Club or Organization Meetings:    • Marital Status:    Intimate Partner Violence:    • Fear of Current or Ex-Partner:    • Emotionally Abused:    • Physically Abused:    • Sexually Abused:        SURGICAL HISTORY  No past surgical history on file.    CURRENT MEDICATIONS   Home Medications     Reviewed by Srinivasa Pleitez R.N. (Registered Nurse) on 08/21/21 at 1440  Med List Status: Partial   Medication Last Dose Status   ferrous sulfate 325 (65 Fe) MG tablet 8/14/2021 Active   ibuprofen (MOTRIN) 600 MG Tab PRN Active                ALLERGIES  No Known Allergies    PHYSICAL EXAM  VITAL SIGNS: /77   Pulse 90   Temp 36.3 °C (97.4 °F) (Temporal)   Resp 20   Ht 1.702 m (5' 7\")   Wt 77 kg (169 lb 12.1 oz)   LMP 10/17/2020   SpO2 96%   BMI 26.59 kg/m²       Constitutional:  Well developed, No acute distress, Non-toxic appearance.   HENT: Normocephalic, Atraumatic, Bilateral external ears normal,  Nose normal.   Eyes: PERRL, EOMI, Conjunctiva normal  Neck: Normal range of motion, No tenderness, Supple  Cardiovascular: Normal heart rate, Normal rhythm.   Thorax & Lungs: Normal breath sounds, No respiratory distress,   Abdomen: Normal right lower quadrant tenderness to palpation, no rebound or guarding, no distention,  Skin: Warm, Dry, No erythema, No rash.   Back: No tenderness, right CVA tenderness.   Extremities: Intact distal pulses, No edema, No tenderness   Neurologic: Alert & oriented x 3, Normal motor function, Normal sensory function, No focal deficits noted.   Psychiatric: appropriate    Labs  Results for orders placed or performed during the hospital encounter of 08/21/21   CBC WITH DIFFERENTIAL   Result " Value Ref Range    WBC 12.9 (H) 4.8 - 10.8 K/uL    RBC 4.90 4.20 - 5.40 M/uL    Hemoglobin 10.2 (L) 12.0 - 16.0 g/dL    Hematocrit 34.9 (L) 37.0 - 47.0 %    MCV 71.2 (L) 81.4 - 97.8 fL    MCH 20.8 (L) 27.0 - 33.0 pg    MCHC 29.2 (L) 33.6 - 35.0 g/dL    RDW 58.9 (H) 35.9 - 50.0 fL    Platelet Count 383 164 - 446 K/uL    Neutrophils-Polys 68.30 44.00 - 72.00 %    Lymphocytes 23.00 22.00 - 41.00 %    Monocytes 6.70 0.00 - 13.40 %    Eosinophils 1.20 0.00 - 6.90 %    Basophils 0.30 0.00 - 1.80 %    Immature Granulocytes 0.50 0.00 - 0.90 %    Nucleated RBC 0.00 /100 WBC    Neutrophils (Absolute) 8.80 (H) 2.00 - 7.15 K/uL    Lymphs (Absolute) 2.96 1.00 - 4.80 K/uL    Monos (Absolute) 0.86 (H) 0.00 - 0.85 K/uL    Eos (Absolute) 0.16 0.00 - 0.51 K/uL    Baso (Absolute) 0.04 0.00 - 0.12 K/uL    Immature Granulocytes (abs) 0.06 0.00 - 0.11 K/uL    NRBC (Absolute) 0.00 K/uL    Hypochromia 1+     Anisocytosis 1+     Microcytosis 1+    COMP METABOLIC PANEL   Result Value Ref Range    Sodium 139 135 - 145 mmol/L    Potassium 4.1 3.6 - 5.5 mmol/L    Chloride 105 96 - 112 mmol/L    Co2 23 20 - 33 mmol/L    Anion Gap 11.0 7.0 - 16.0    Glucose 103 (H) 65 - 99 mg/dL    Bun 7 (L) 8 - 22 mg/dL    Creatinine 0.52 0.50 - 1.40 mg/dL    Calcium 9.2 8.4 - 10.2 mg/dL    AST(SGOT) 43 12 - 45 U/L    ALT(SGPT) 47 2 - 50 U/L    Alkaline Phosphatase 156 (H) 30 - 99 U/L    Total Bilirubin 0.2 0.1 - 1.5 mg/dL    Albumin 3.8 3.2 - 4.9 g/dL    Total Protein 7.4 6.0 - 8.2 g/dL    Globulin 3.6 (H) 1.9 - 3.5 g/dL    A-G Ratio 1.1 g/dL   LIPASE   Result Value Ref Range    Lipase 25 7 - 58 U/L   HCG QUAL SERUM   Result Value Ref Range    Beta-Hcg Qualitative Serum Negative Negative   ESTIMATED GFR   Result Value Ref Range    GFR If African American >60 >60 mL/min/1.73 m 2    GFR If Non African American >60 >60 mL/min/1.73 m 2   PLATELET ESTIMATE   Result Value Ref Range    Plt Estimation Normal    MORPHOLOGY   Result Value Ref Range    RBC Morphology  Present     Polychromia 1+     Poikilocytosis 1+     Ovalocytes 1+     Tear Drop Cells 1+    DIFFERENTIAL COMMENT   Result Value Ref Range    Comments-Diff see below        RADIOLOGY/PROCEDURES  CT-RENAL COLIC EVALUATION(A/P W/O)   Final Result      1.  Mild right hydronephrosis, which may be due to a recently passed stone. No renal or ureteral stones identified.   2.  Mild fat stranding surrounding the partially distended bladder. Please correlate with urinalysis for cystitis/UTI.          COURSE & MEDICAL DECISION MAKING  Pertinent Labs & Imaging studies reviewed. (See chart for details)  Patient presents emergency department with dysuria and nausea.  Pain radiates around into her back area.  Patient has not had any fevers vomiting or diarrhea.  I doubt appendicitis.  With her colicky pain in the dysuria kidney stone is in the differential.  Also UTI is a possibility.  Patient recently had a baby but denies any vaginal discharge or vaginal bleeding.  There for I think ovarian etiology is less likely.  We will however rule out pregnancy.  I also doubt endometritis since her birth was approximately 2 months ago.    Patient CT shows mild right hydronephrosis but no evidence of renal or ureteral stones.  There are some mild fat stranding surrounding the bladder.  I think this is consistent with her UTI seen in her urinalysis.  I discussed the case with Dr. Bui concerning the hydronephrosis.  At this point I do not suspect a retained stone.  Patient is not showing any signs of acute sepsis.  I believe she stable for outpatient management.  She did have 1 low blood pressure which I think is more related to her age.  She is not tachycardic she is afebrile and overall looks well.  She is tolerating p.o.  Repeat abdominal exam is benign.  Return precautions were given.  Patient understands the plan.    FINAL IMPRESSION     1. Acute UTI             Electronically signed by: Mami Fernández M.D., 8/21/2021 4:20 PM

## 2021-08-22 NOTE — DISCHARGE INSTRUCTIONS
Take all of the antibiotics as directed.  Drink plenty of fluids.  If you have continued pain, vomiting, develop a fever or of any other issues return for recheck.  Hope you feel better soon.

## 2021-08-22 NOTE — ED NOTES
Discharged to home with instructions and prescriptions sent to pharmacy. Patient to follow up with her doctor and/or return here for worsening condition.

## 2021-08-22 NOTE — ED NOTES
Pharmacy Medication Reconciliation      ~Medication reconciliation updated and complete per pt at bedside  ~Allergies have been verified   ~No oral ABX within the last 30 days  ~Patient home pharmacy:[facility]

## 2021-08-23 LAB
BACTERIA UR CULT: ABNORMAL
BACTERIA UR CULT: ABNORMAL
SIGNIFICANT IND 70042: ABNORMAL
SITE SITE: ABNORMAL
SOURCE SOURCE: ABNORMAL

## 2021-08-23 NOTE — ED NOTES
"ED Positive Culture Follow-up/Notification Note:    Date: 8/23/2021     Patient seen in the ED on 8/21/2021 for abdominal pain, nausea, and flank pain. Right CVA tenderness on physical exam. CT with mild right hydronephrosis and no evidence of renal or ureteral stones. Patient received ceftriaxone 1 g IV x1 in the ED.  1. Acute UTI       Discharge Medication List as of 8/21/2021  6:09 PM      START taking these medications    Details   cefdinir (OMNICEF) 300 MG Cap Take 1 Capsule by mouth 2 times a day for 7 days., Disp-14 Capsule, R-0, Normal             Allergies: Patient has no known allergies.     Vitals:    08/21/21 1437 08/21/21 1648 08/21/21 1730 08/21/21 1745   BP: 122/77 (!) 94/45 (!) 96/54 103/54   Pulse: 90 94 98 95   Resp: 20      Temp: 36.3 °C (97.4 °F)      TempSrc: Temporal      SpO2: 96% 95% 96% 96%   Weight: 77 kg (169 lb 12.1 oz)      Height: 1.702 m (5' 7\")          Final cultures:   Results     Procedure Component Value Units Date/Time    URINE CULTURE(NEW) [[id number]]  (Abnormal)  (Susceptibility) Collected: 08/21/21 1625    Order Status: Completed Specimen: Urine Updated: 08/23/21 1256     Significant Indicator POS     Source UR     Site -     Culture Result Usual skin silvia 10-50,000 cfu/mL      Escherichia coli  10-50,000 cfu/mL      Narrative:      Indication for culture:->Patient WITHOUT an indwelling Bey  catheter in place with new onset of Dysuria, Frequency,  Urgency, and/or Suprapubic pain    Susceptibility     Escherichia coli (1)     Antibiotic Interpretation Microscan Method Status    Amikacin  [*]  Sensitive <=16 mcg/mL MANI Final    Ampicillin Sensitive <=8 mcg/mL MANI Final    Amoxicillin/CA  [*]  Sensitive <=8/4 mcg/mL MANI Final    Aztreonam  [*]  Sensitive <=4 mcg/mL MANI Final    Ceftolozane+Tazobactam  [*]  Sensitive <=2 mcg/mL MANI Final    Ceftriaxone Sensitive <=1 mcg/mL MANI Final    Ceftazidime  [*]  Sensitive <=1 mcg/mL MANI Final    Cefazolin Sensitive <=2 mcg/mL MNAI Final "    Ciprofloxacin Sensitive <=0.25 mcg/mL MANI Final    Cefepime Sensitive <=2 mcg/mL MANI Final    Cefuroxime Sensitive 8 mcg/mL MANI Final    Ampicillin/sulbactam Sensitive <=4/2 mcg/mL MANI Final    Ceftazidime+Avibactam  [*]  Sensitive <=4 mcg/mL MANI Final    Tobramycin Sensitive <=2 mcg/mL MANI Final    Ertapenem  [*]  Sensitive <=0.5 mcg/mL MANI Final    Nitrofurantoin Sensitive <=32 mcg/mL MANI Final    Gentamicin Sensitive <=2 mcg/mL MANI Final    Imipenem  [*]  Sensitive <=1 mcg/mL MANI Final    Levofloxacin Sensitive <=0.5 mcg/mL MANI Final    Meropenem  [*]  Sensitive <=1 mcg/mL MANI Final    Meropenem/Vaborbactam  [*]  Sensitive <=2 mcg/mL MANI Final    Pip/Tazobactam Sensitive <=8 mcg/mL MANI Final    Trimeth/Sulfa Sensitive <=0.5/9.5 mcg/mL MANI Final    Tetracycline  [*]  Sensitive <=4 mcg/mL MANI Final    Tigecycline Sensitive <=2 mcg/mL MANI Final           [*]  Suppressed Antibiotic                 URINALYSIS,CULTURE IF INDICATED [[id number]]  (Abnormal) Collected: 08/21/21 1625    Order Status: Completed Specimen: Urine Updated: 08/21/21 1654     Color Yellow     Character Sl Cloudy     Specific Gravity 1.010     Ph 6.5     Glucose Negative mg/dL      Ketones Negative mg/dL      Protein 100 mg/dL      Bilirubin Negative     Nitrite Negative     Leukocyte Esterase Large     Occult Blood Moderate     Micro Urine Req Microscopic    Narrative:      Indication for culture:->Patient WITHOUT an indwelling Bey  catheter in place with new onset of Dysuria, Frequency,  Urgency, and/or Suprapubic pain          Plan:   Appropriate antibiotic therapy prescribed. No changes required based upon culture result. Called patient to see how she is doing - patient said that she is feeling much better. Informed her of culture results and the importance of adherence with antibiotic therapy. Patient verbalized understanding.    Daisha Kurtz, PharmD  PGY2 Infectious Diseases Pharmacy Resident

## 2021-12-25 ENCOUNTER — HOSPITAL ENCOUNTER (EMERGENCY)
Facility: MEDICAL CENTER | Age: 21
End: 2021-12-26
Attending: EMERGENCY MEDICINE
Payer: MEDICAID

## 2021-12-25 ENCOUNTER — APPOINTMENT (OUTPATIENT)
Dept: RADIOLOGY | Facility: MEDICAL CENTER | Age: 21
End: 2021-12-25
Attending: EMERGENCY MEDICINE
Payer: MEDICAID

## 2021-12-25 DIAGNOSIS — O20.9 VAGINAL BLEEDING AFFECTING EARLY PREGNANCY: ICD-10-CM

## 2021-12-25 LAB
ALBUMIN SERPL BCP-MCNC: 4 G/DL (ref 3.2–4.9)
ALBUMIN/GLOB SERPL: 1.2 G/DL
ALP SERPL-CCNC: 139 U/L (ref 30–99)
ALT SERPL-CCNC: 20 U/L (ref 2–50)
ANION GAP SERPL CALC-SCNC: 12 MMOL/L (ref 7–16)
ANISOCYTOSIS BLD QL SMEAR: ABNORMAL
APPEARANCE UR: CLEAR
AST SERPL-CCNC: 22 U/L (ref 12–45)
B-HCG SERPL-ACNC: 314 MIU/ML (ref 0–5)
BACTERIA #/AREA URNS HPF: NEGATIVE /HPF
BASOPHILS # BLD AUTO: 0.6 % (ref 0–1.8)
BASOPHILS # BLD: 0.06 K/UL (ref 0–0.12)
BILIRUB SERPL-MCNC: <0.2 MG/DL (ref 0.1–1.5)
BILIRUB UR QL STRIP.AUTO: NEGATIVE
BUN SERPL-MCNC: 7 MG/DL (ref 8–22)
CALCIUM SERPL-MCNC: 8.9 MG/DL (ref 8.5–10.5)
CHLORIDE SERPL-SCNC: 105 MMOL/L (ref 96–112)
CO2 SERPL-SCNC: 21 MMOL/L (ref 20–33)
COLOR UR: YELLOW
COMMENT 1642: NORMAL
CREAT SERPL-MCNC: 0.46 MG/DL (ref 0.5–1.4)
DACRYOCYTES BLD QL SMEAR: NORMAL
EOSINOPHIL # BLD AUTO: 0.22 K/UL (ref 0–0.51)
EOSINOPHIL NFR BLD: 2.1 % (ref 0–6.9)
EPI CELLS #/AREA URNS HPF: ABNORMAL /HPF
ERYTHROCYTE [DISTWIDTH] IN BLOOD BY AUTOMATED COUNT: 43.4 FL (ref 35.9–50)
GLOBULIN SER CALC-MCNC: 3.4 G/DL (ref 1.9–3.5)
GLUCOSE SERPL-MCNC: 101 MG/DL (ref 65–99)
GLUCOSE UR STRIP.AUTO-MCNC: NEGATIVE MG/DL
HCT VFR BLD AUTO: 35.3 % (ref 37–47)
HGB BLD-MCNC: 10.2 G/DL (ref 12–16)
HYALINE CASTS #/AREA URNS LPF: ABNORMAL /LPF
IMM GRANULOCYTES # BLD AUTO: 0.03 K/UL (ref 0–0.11)
IMM GRANULOCYTES NFR BLD AUTO: 0.3 % (ref 0–0.9)
KETONES UR STRIP.AUTO-MCNC: NEGATIVE MG/DL
LEUKOCYTE ESTERASE UR QL STRIP.AUTO: ABNORMAL
LIPASE SERPL-CCNC: 34 U/L (ref 11–82)
LYMPHOCYTES # BLD AUTO: 3.74 K/UL (ref 1–4.8)
LYMPHOCYTES NFR BLD: 36.5 % (ref 22–41)
MACROCYTES BLD QL SMEAR: ABNORMAL
MCH RBC QN AUTO: 19.2 PG (ref 27–33)
MCHC RBC AUTO-ENTMCNC: 28.9 G/DL (ref 33.6–35)
MCV RBC AUTO: 66.5 FL (ref 81.4–97.8)
MICRO URNS: ABNORMAL
MICROCYTES BLD QL SMEAR: ABNORMAL
MONOCYTES # BLD AUTO: 0.81 K/UL (ref 0–0.85)
MONOCYTES NFR BLD AUTO: 7.9 % (ref 0–13.4)
MORPHOLOGY BLD-IMP: NORMAL
NEUTROPHILS # BLD AUTO: 5.4 K/UL (ref 2–7.15)
NEUTROPHILS NFR BLD: 52.6 % (ref 44–72)
NITRITE UR QL STRIP.AUTO: NEGATIVE
NRBC # BLD AUTO: 0 K/UL
NRBC BLD-RTO: 0 /100 WBC
NUMBER OF RH DOSES IND 8505RD: NORMAL
OVALOCYTES BLD QL SMEAR: NORMAL
PH UR STRIP.AUTO: 6.5 [PH] (ref 5–8)
PLATELET # BLD AUTO: 445 K/UL (ref 164–446)
PLATELET BLD QL SMEAR: NORMAL
PMV BLD AUTO: 11 FL (ref 9–12.9)
POIKILOCYTOSIS BLD QL SMEAR: NORMAL
POTASSIUM SERPL-SCNC: 3.6 MMOL/L (ref 3.6–5.5)
PROT SERPL-MCNC: 7.4 G/DL (ref 6–8.2)
PROT UR QL STRIP: NEGATIVE MG/DL
RBC # BLD AUTO: 5.31 M/UL (ref 4.2–5.4)
RBC # URNS HPF: >150 /HPF
RBC BLD AUTO: PRESENT
RBC UR QL AUTO: ABNORMAL
RH BLD: NORMAL
SODIUM SERPL-SCNC: 138 MMOL/L (ref 135–145)
SP GR UR STRIP.AUTO: 1.02
UROBILINOGEN UR STRIP.AUTO-MCNC: 1 MG/DL
WBC # BLD AUTO: 10.3 K/UL (ref 4.8–10.8)
WBC #/AREA URNS HPF: ABNORMAL /HPF

## 2021-12-25 PROCEDURE — 86901 BLOOD TYPING SEROLOGIC RH(D): CPT

## 2021-12-25 PROCEDURE — 84702 CHORIONIC GONADOTROPIN TEST: CPT

## 2021-12-25 PROCEDURE — 99284 EMERGENCY DEPT VISIT MOD MDM: CPT

## 2021-12-25 PROCEDURE — 80053 COMPREHEN METABOLIC PANEL: CPT

## 2021-12-25 PROCEDURE — 83690 ASSAY OF LIPASE: CPT

## 2021-12-25 PROCEDURE — 85025 COMPLETE CBC W/AUTO DIFF WBC: CPT

## 2021-12-25 PROCEDURE — 81001 URINALYSIS AUTO W/SCOPE: CPT

## 2021-12-25 ASSESSMENT — FIBROSIS 4 INDEX: FIB4 SCORE: 0.33

## 2021-12-25 ASSESSMENT — LIFESTYLE VARIABLES: DO YOU DRINK ALCOHOL: NO

## 2021-12-25 ASSESSMENT — PAIN DESCRIPTION - PAIN TYPE: TYPE: ACUTE PAIN

## 2021-12-26 VITALS
RESPIRATION RATE: 16 BRPM | BODY MASS INDEX: 27.61 KG/M2 | OXYGEN SATURATION: 99 % | HEIGHT: 67 IN | DIASTOLIC BLOOD PRESSURE: 76 MMHG | HEART RATE: 89 BPM | TEMPERATURE: 98.4 F | SYSTOLIC BLOOD PRESSURE: 131 MMHG | WEIGHT: 175.93 LBS

## 2021-12-26 PROCEDURE — 76817 TRANSVAGINAL US OBSTETRIC: CPT

## 2021-12-26 NOTE — ED TRIAGE NOTES
Porfirio Keenan  21 y.o.  female  Chief Complaint   Patient presents with   • Vaginal Bleeding     c/o vaginal bleeding started yesterday with lower back pain and lower abdominal cramping. states that she is pregnant via home pregnancy test. LMP - Nov 7 th.

## 2021-12-26 NOTE — ED PROVIDER NOTES
"ED Provider Note    CHIEF COMPLAINT  Chief Complaint   Patient presents with   • Vaginal Bleeding     c/o vaginal bleeding started yesterday with lower back pain and lower abdominal cramping. states that she is pregnant via home pregnancy test. LMP - Nov 7 th.        HPI  Porfirio Keenan is a 21 y.o. female who presents to the emergency department with vaginal bleeding and lower abdominal cramping.  Patient is a G2, P1 with a 5-month-old at home she is not breast-feeding she is sexually active and not using protection.  Her last menstrual period was November 7 yesterday she started having light bleeding and since then the bleeding is progressed to the point that it is passing clots and a lot of cramping in her lower abdomen radiating to her low mid back.  There is no flank pain no dysuria no hematuria no history of easy bleeding or bruising no nausea or vomiting.    REVIEW OF SYSTEMS  Positives as above. Pertinent negatives include nausea vomiting easy bleeding bruising dysuria hematuria flank pain vaginal discharge or itching  All other review of systems are negative    PAST MEDICAL HISTORY   has a past medical history of Anemia, Breath shortness, and Cold.    SOCIAL HISTORY  Social History     Tobacco Use   • Smoking status: Never Smoker   • Smokeless tobacco: Never Used   Vaping Use   • Vaping Use: Never used   Substance and Sexual Activity   • Alcohol use: No   • Drug use: No   • Sexual activity: Not on file       SURGICAL HISTORY  patient denies any surgical history    CURRENT MEDICATIONS  Home Medications     Reviewed by Dwayne Bennett R.N. (Registered Nurse) on 12/25/21 at 2220  Med List Status: Partial   Medication Last Dose Status   ferrous sulfate 325 (65 Fe) MG tablet  Active                ALLERGIES  No Known Allergies    PHYSICAL EXAM  VITAL SIGNS: /74   Pulse 88   Temp 37 °C (98.6 °F) (Temporal)   Resp 18   Ht 1.702 m (5' 7\")   Wt 79.8 kg (175 lb 14.8 oz)   LMP 11/07/2021   SpO2 100%   BMI " 27.55 kg/m²    Pulse ox interpretation: I interpret this pulse ox as normal.  Constitutional: Alert in no apparent distress.  HENT: Normocephalic atraumatic, MMM  Eyes: PER, Conjunctiva normal, Non-icteric.   Cardiovascular: Regular rate and rhythm, no murmurs.   Thorax & Lungs: Normal breath sounds, No respiratory distress, No wheezing, No chest tenderness.   Abdomen: Bowel sounds normal, Soft, No tenderness, No pulsatile masses. No peritoneal signs.  Skin: Warm, Dry, No erythema, No rash.   Back: No bony tenderness, No CVA tenderness.   Extremities/MSK: Intact equal distal pulses, No edema, No tenderness, No cyanosis, no major deformities noted  Neurologic: Alert and oriented x3, No focal deficits noted.       DIFFERENTIAL DIAGNOSIS AND WORK UP PLAN    This is a 21 y.o. female who presents with vaginal bleeding in the setting of positive pregnancy test at home which due to LMP at the latest to be 7 weeks and 0 days pregnant she has a nonfocal complaint of discomfort in the lower abdomen and passing large clots differential includes missed or spontaneous  versus ectopic pregnancy.  Her abdomen is soft nontender she is not peritoneal will be performing laboratory analysis and ultrasound    DIAGNOSTIC STUDIES / PROCEDURES    EKG  No results found for this or any previous visit.    LABS  Pertinent Lab Findings  Hemoglobin is 10 within normal limits, CMP within normal limits hCG is only 314 urinalysis with hematuria but that is consistent with vaginal bleeding so is not actively having hematuria lipase is normal  Rh+      RADIOLOGY  US-OB TRANSVAGINAL ONLY   Final Result      No intrauterine gestation is identified. Findings are nonspecific and may represent failed first trimester pregnancy or early normal intrauterine pregnancy.        The radiologist's interpretation of all radiological studies have been reviewed by me.      COURSE & MEDICAL DECISION MAKING  Pertinent Labs & Imaging studies reviewed. (See  chart for details)    12:20 AM  I reassessed patient bedside she is resting comfortably ultrasound does not show any findings of ectopic or intrauterine pregnancy her hCG quant is extremely low and her Rh is positive.  We had a discussion that this could be an early miscarriage or an early ectopic either way she needs repeat hCG in 48 to 72 hours so either Monday or Tuesday and follow-up with her OB/GYN Dr. Becerra.  She will be given an outpatient lab slip to have her blood drawn either Monday or Tuesday she will call the office on Monday for follow-up next week and strict return precautions for severe worsening bleeding with dizziness or severe pain    I verified that the patient was wearing a mask and I was wearing appropriate PPE every time I entered the room. The patient's mask was on the patient at all times during my encounter except for a brief view of the oropharynx.    The patient will return for new or worsening symptoms and is stable at the time of discharge.    The patient is referred to a primary physician for blood pressure management, diabetic screening, and for all other preventative health concerns.    DISPOSITION:  Patient will be discharged home in stable condition.    FOLLOW UP:  Corinne E Capurro, M.D.  [address]  [facility] [id number] [phone]    Call on 12/27/2021  to schedule follow up    [facility], Emergency Dept  [address] [id number] [phone]    If symptoms worsen      OUTPATIENT MEDICATIONS:  New Prescriptions    No medications on file           FINAL IMPRESSION  1. Vaginal bleeding affecting early pregnancy             Electronically signed by: Ainta Carroll M.D., 12/25/2021 11:32 PM    This dictation has been created using voice recognition software and/or scribes. The accuracy of the dictation is limited by the abilities of the software and the expertise of the scribes. I expect there may be some errors of grammar  and possibly content. I made every attempt to manually correct the errors within my dictation. However, errors related to voice recognition software and/or scribes may still exist and should be interpreted within the appropriate context.

## 2021-12-26 NOTE — DISCHARGE INSTRUCTIONS
It is possible that you are having an early miscarriage or you have an early ectopic pregnancy I suspect is likely the former rather than the latter however it is important that you get a repeat hormone count either Monday or Tuesday and call Dr. Crystal's office on Monday for follow-up next week.  Please return to the emergency department for any new worsening severe pain vomiting heavy vaginal bleeding with dizziness.

## 2021-12-27 ENCOUNTER — APPOINTMENT (OUTPATIENT)
Dept: LAB | Facility: MEDICAL CENTER | Age: 21
End: 2021-12-27
Payer: MEDICAID

## 2022-06-28 ENCOUNTER — HOSPITAL ENCOUNTER (EMERGENCY)
Facility: MEDICAL CENTER | Age: 22
End: 2022-06-28
Attending: EMERGENCY MEDICINE
Payer: MEDICAID

## 2022-06-28 VITALS
HEART RATE: 87 BPM | BODY MASS INDEX: 28.93 KG/M2 | RESPIRATION RATE: 18 BRPM | TEMPERATURE: 98.6 F | SYSTOLIC BLOOD PRESSURE: 129 MMHG | DIASTOLIC BLOOD PRESSURE: 59 MMHG | HEIGHT: 67 IN | OXYGEN SATURATION: 97 % | WEIGHT: 184.3 LBS

## 2022-06-28 DIAGNOSIS — J02.9 PHARYNGITIS, UNSPECIFIED ETIOLOGY: ICD-10-CM

## 2022-06-28 DIAGNOSIS — H66.90 ACUTE OTITIS MEDIA, UNSPECIFIED OTITIS MEDIA TYPE: ICD-10-CM

## 2022-06-28 LAB
ALBUMIN SERPL BCP-MCNC: 3.4 G/DL (ref 3.2–4.9)
ALBUMIN/GLOB SERPL: 1 G/DL
ALP SERPL-CCNC: 91 U/L (ref 30–99)
ALT SERPL-CCNC: 7 U/L (ref 2–50)
ANION GAP SERPL CALC-SCNC: 11 MMOL/L (ref 7–16)
ANISOCYTOSIS BLD QL SMEAR: ABNORMAL
AST SERPL-CCNC: 10 U/L (ref 12–45)
B-HCG SERPL-ACNC: ABNORMAL MIU/ML (ref 0–5)
BASOPHILS # BLD AUTO: 0 % (ref 0–1.8)
BASOPHILS # BLD: 0 K/UL (ref 0–0.12)
BILIRUB SERPL-MCNC: <0.2 MG/DL (ref 0.1–1.5)
BUN SERPL-MCNC: 4 MG/DL (ref 8–22)
CALCIUM SERPL-MCNC: 8.9 MG/DL (ref 8.5–10.5)
CHLORIDE SERPL-SCNC: 104 MMOL/L (ref 96–112)
CO2 SERPL-SCNC: 21 MMOL/L (ref 20–33)
CREAT SERPL-MCNC: 0.33 MG/DL (ref 0.5–1.4)
DACRYOCYTES BLD QL SMEAR: NORMAL
EOSINOPHIL # BLD AUTO: 0.12 K/UL (ref 0–0.51)
EOSINOPHIL NFR BLD: 0.9 % (ref 0–6.9)
ERYTHROCYTE [DISTWIDTH] IN BLOOD BY AUTOMATED COUNT: 68.1 FL (ref 35.9–50)
FLUAV RNA SPEC QL NAA+PROBE: NEGATIVE
FLUBV RNA SPEC QL NAA+PROBE: NEGATIVE
GFR SERPLBLD CREATININE-BSD FMLA CKD-EPI: 151 ML/MIN/1.73 M 2
GLOBULIN SER CALC-MCNC: 3.4 G/DL (ref 1.9–3.5)
GLUCOSE SERPL-MCNC: 97 MG/DL (ref 65–99)
HCT VFR BLD AUTO: 38.1 % (ref 37–47)
HETEROPH AB SER QL: NEGATIVE
HGB BLD-MCNC: 11.6 G/DL (ref 12–16)
LIPASE SERPL-CCNC: 25 U/L (ref 11–82)
LYMPHOCYTES # BLD AUTO: 3.39 K/UL (ref 1–4.8)
LYMPHOCYTES NFR BLD: 26.1 % (ref 22–41)
MANUAL DIFF BLD: NORMAL
MCH RBC QN AUTO: 22 PG (ref 27–33)
MCHC RBC AUTO-ENTMCNC: 30.4 G/DL (ref 33.6–35)
MCV RBC AUTO: 72.2 FL (ref 81.4–97.8)
MICROCYTES BLD QL SMEAR: ABNORMAL
MONOCYTES # BLD AUTO: 0.46 K/UL (ref 0–0.85)
MONOCYTES NFR BLD AUTO: 3.5 % (ref 0–13.4)
MORPHOLOGY BLD-IMP: NORMAL
NEUTROPHILS # BLD AUTO: 9.04 K/UL (ref 2–7.15)
NEUTROPHILS NFR BLD: 69.5 % (ref 44–72)
NRBC # BLD AUTO: 0 K/UL
NRBC BLD-RTO: 0 /100 WBC
OVALOCYTES BLD QL SMEAR: NORMAL
PLATELET # BLD AUTO: 312 K/UL (ref 164–446)
PLATELET BLD QL SMEAR: NORMAL
PMV BLD AUTO: 10.6 FL (ref 9–12.9)
POIKILOCYTOSIS BLD QL SMEAR: NORMAL
POTASSIUM SERPL-SCNC: 3.8 MMOL/L (ref 3.6–5.5)
PROT SERPL-MCNC: 6.8 G/DL (ref 6–8.2)
RBC # BLD AUTO: 5.28 M/UL (ref 4.2–5.4)
RBC BLD AUTO: PRESENT
RSV RNA SPEC QL NAA+PROBE: NEGATIVE
S PYO DNA SPEC NAA+PROBE: NOT DETECTED
SARS-COV-2 RNA RESP QL NAA+PROBE: NOTDETECTED
SODIUM SERPL-SCNC: 136 MMOL/L (ref 135–145)
SPECIMEN SOURCE: NORMAL
WBC # BLD AUTO: 13 K/UL (ref 4.8–10.8)

## 2022-06-28 PROCEDURE — 85025 COMPLETE CBC W/AUTO DIFF WBC: CPT

## 2022-06-28 PROCEDURE — 83690 ASSAY OF LIPASE: CPT

## 2022-06-28 PROCEDURE — A9270 NON-COVERED ITEM OR SERVICE: HCPCS | Performed by: EMERGENCY MEDICINE

## 2022-06-28 PROCEDURE — 99283 EMERGENCY DEPT VISIT LOW MDM: CPT

## 2022-06-28 PROCEDURE — 0241U HCHG SARS-COV-2 COVID-19 NFCT DS RESP RNA 4 TRGT MIC: CPT

## 2022-06-28 PROCEDURE — 80053 COMPREHEN METABOLIC PANEL: CPT

## 2022-06-28 PROCEDURE — 87651 STREP A DNA AMP PROBE: CPT

## 2022-06-28 PROCEDURE — 700102 HCHG RX REV CODE 250 W/ 637 OVERRIDE(OP): Performed by: EMERGENCY MEDICINE

## 2022-06-28 PROCEDURE — 86308 HETEROPHILE ANTIBODY SCREEN: CPT

## 2022-06-28 PROCEDURE — 84702 CHORIONIC GONADOTROPIN TEST: CPT

## 2022-06-28 PROCEDURE — 85007 BL SMEAR W/DIFF WBC COUNT: CPT

## 2022-06-28 RX ORDER — ACETAMINOPHEN 500 MG
1000 TABLET ORAL EVERY 6 HOURS PRN
Qty: 20 TABLET | Refills: 0 | Status: SHIPPED | OUTPATIENT
Start: 2022-06-28 | End: 2022-07-02

## 2022-06-28 RX ORDER — ACETAMINOPHEN 500 MG
1000 TABLET ORAL ONCE
Status: COMPLETED | OUTPATIENT
Start: 2022-06-28 | End: 2022-06-28

## 2022-06-28 RX ORDER — AMOXICILLIN AND CLAVULANATE POTASSIUM 875; 125 MG/1; MG/1
1 TABLET, FILM COATED ORAL ONCE
Status: COMPLETED | OUTPATIENT
Start: 2022-06-28 | End: 2022-06-28

## 2022-06-28 RX ORDER — AMOXICILLIN AND CLAVULANATE POTASSIUM 875; 125 MG/1; MG/1
1 TABLET, FILM COATED ORAL 2 TIMES DAILY
Qty: 20 TABLET | Refills: 0 | Status: SHIPPED | OUTPATIENT
Start: 2022-06-28 | End: 2022-07-08

## 2022-06-28 RX ADMIN — ACETAMINOPHEN 1000 MG: 500 TABLET, FILM COATED ORAL at 05:16

## 2022-06-28 RX ADMIN — AMOXICILLIN AND CLAVULANATE POTASSIUM 1 TABLET: 875; 125 TABLET, FILM COATED ORAL at 05:16

## 2022-06-28 ASSESSMENT — FIBROSIS 4 INDEX: FIB4 SCORE: 0.22

## 2022-06-28 NOTE — ED TRIAGE NOTES
"Chief Complaint   Patient presents with   • Ear Pain     R ear, began 2030 last night, sharp consistent 10/10 pain, decreased hearing, not recently in any water   • Swollen Glands     Tonsils swollen, began 6/23 and still swollen, cough       Ambulated to triage with partner for above complaint. General sick like sx, nauseous. Pt 14 weeks pregnant, states only has morning sickness to certain foods.    GI pain protocol ordered. Pt brought to Phleb office for blood draw. Pt educated of triage process and informed to contact staff if situation changes.    /71   Pulse 98   Temp 36.9 °C (98.4 °F) (Temporal)   Resp 14   Ht 1.702 m (5' 7\")   Wt 83.6 kg (184 lb 4.9 oz)   LMP 03/14/2022 (Approximate)   SpO2 99%   BMI 28.87 kg/m²       "

## 2022-06-28 NOTE — ED PROVIDER NOTES
"ED Provider Note    Scribed for Luca Burns by Aurora Andrea. 6/28/2022  4:52 AM    Primary care provider: Pcp Pt States None  Means of arrival: Walk-in  History obtained from: Patient  History limited by: None    CHIEF COMPLAINT  Chief Complaint   Patient presents with   • Ear Pain     R ear, began 2030 last night, sharp consistent 10/10 pain, decreased hearing, not recently in any water   • Swollen Glands     Tonsils swollen, began 6/23 and still swollen, cough     HPI  Porfirio Keenan is a 21 y.o. female who presents to the Emergency Department with sudden onset right ear pain onset 830 PM last night. She describes the pain as \"sharp\" and rates it at a 10/10. She has also a sore throat and swollen tonsils that began one week ago. She also endorses some associated cough and some abdominal pain. She has frequent ear infections, most recently one year ago. She presents 14 weeks pregnant, but denies any abnormal nausea, vomiting or diarrhea. She has been taking Tylenol for alleviation of symptoms. She has not been experiencing a fever. She is not vaccinated against Covid-19. She has tested negative on two at-home covid tests since Thursday, but not since her ear pain began. She has a past history of anemia, which she just started receiving infusions for. She does not take any daily medications.    Quality:sharp  Duration: 8 hours  Severity: moderate  Associated sx: sore throat, tonsillar swelling, cough, abdominal pain    REVIEW OF SYSTEMS  As above, all other systems reviewed and are negative.   See HPI for further details.     PAST MEDICAL HISTORY   has a past medical history of Anemia, Breath shortness, and Cold.  SURGICAL HISTORY  patient denies any surgical history  SOCIAL HISTORY  Social History     Tobacco Use   • Smoking status: Never Smoker   • Smokeless tobacco: Never Used   Vaping Use   • Vaping Use: Never used   Substance Use Topics   • Alcohol use: No   • Drug use: No      Social History "     Substance and Sexual Activity   Drug Use No     FAMILY HISTORY  History reviewed. No pertinent family history.  CURRENT MEDICATIONS  Home Medications     Reviewed by Perla Horvath R.N. (Registered Nurse) on 06/28/22 at 0330  Med List Status: Partial   Medication Last Dose Status   ferrous sulfate 325 (65 Fe) MG tablet  Active              ALLERGIES  No Known Allergies    PHYSICAL EXAM    VITAL SIGNS:   Vitals:    06/28/22 0453 06/28/22 0508 06/28/22 0519 06/28/22 0520   BP: 130/61 120/60 129/59    Pulse: 86 90 (!) 109 87   Resp: 16 16 18    Temp:    37 °C (98.6 °F)   TempSrc:       SpO2: 96% 95% 97% 97%   Weight:       Height:           Vitals: My interpretation: normotensive, not tachycardic, afebrile, not hypoxic    Reinterpretation of vitals: Improved    PE:   Constitutional: Well developed, Well nourished, No acute distress, Non-toxic appearance.   HENT: Posterior oropharynx with mild bilateral tonsillar swelling and edema. Uvula is midline. No phonations changes. Tolerates secretions well. Right TM is erythematous. Poor light reflex consistent with otitis media. External auditory canal is unremarkable. Ear canal without purulent discharge. Normocephalic, Atraumatic, Bilateral external ears normal, mucous membranes are moist. No oral exudates or nasal discharge.   Eyes: Pupils are equal round and reactive, EOMI, Conjunctiva normal, No discharge.   Neck: Normal range of motion, No tenderness, Supple, No stridor. No meningismus.  Lymphatic: No appreciative lymphadenopathy.  Cardiovascular: Regular rate and rhythm without murmur rub or gallop.  Thorax & Lungs: Clear breath sounds bilaterally without wheezes, rhonchi or rales. There is no chest wall tenderness.   Abdomen: Soft non-tender non-distended. There is no rebound or guarding. No organomegaly is appreciated. Bowel sounds are normal.  Skin: Normal without rash.   Back: No CVA or spinal tenderness.   Extremities: Intact distal pulses, No edema, No  tenderness, No cyanosis, No clubbing. Capillary refill is less than 2 seconds.  Musculoskeletal: Good range of motion in all major joints. No tenderness to palpation or major deformities noted.   Neurologic: Alert & oriented x 3, Normal motor function, Normal sensory function, No focal deficits noted. Reflexes are normal.  Psychiatric: Affect normal, Judgment normal, Mood normal. There is no suicidal ideation or patient reported hallucinations.     DIAGNOSTIC STUDIES / PROCEDURES    LABS  Results for orders placed or performed during the hospital encounter of 06/28/22   CBC WITH DIFFERENTIAL   Result Value Ref Range    WBC 13.0 (H) 4.8 - 10.8 K/uL    RBC 5.28 4.20 - 5.40 M/uL    Hemoglobin 11.6 (L) 12.0 - 16.0 g/dL    Hematocrit 38.1 37.0 - 47.0 %    MCV 72.2 (L) 81.4 - 97.8 fL    MCH 22.0 (L) 27.0 - 33.0 pg    MCHC 30.4 (L) 33.6 - 35.0 g/dL    RDW 68.1 (H) 35.9 - 50.0 fL    Platelet Count 312 164 - 446 K/uL    MPV 10.6 9.0 - 12.9 fL    Neutrophils-Polys 69.50 44.00 - 72.00 %    Lymphocytes 26.10 22.00 - 41.00 %    Monocytes 3.50 0.00 - 13.40 %    Eosinophils 0.90 0.00 - 6.90 %    Basophils 0.00 0.00 - 1.80 %    Nucleated RBC 0.00 /100 WBC    Neutrophils (Absolute) 9.04 (H) 2.00 - 7.15 K/uL    Lymphs (Absolute) 3.39 1.00 - 4.80 K/uL    Monos (Absolute) 0.46 0.00 - 0.85 K/uL    Eos (Absolute) 0.12 0.00 - 0.51 K/uL    Baso (Absolute) 0.00 0.00 - 0.12 K/uL    NRBC (Absolute) 0.00 K/uL    Anisocytosis 1+     Microcytosis 1+    COMP METABOLIC PANEL   Result Value Ref Range    Sodium 136 135 - 145 mmol/L    Potassium 3.8 3.6 - 5.5 mmol/L    Chloride 104 96 - 112 mmol/L    Co2 21 20 - 33 mmol/L    Anion Gap 11.0 7.0 - 16.0    Glucose 97 65 - 99 mg/dL    Bun 4 (L) 8 - 22 mg/dL    Creatinine 0.33 (L) 0.50 - 1.40 mg/dL    Calcium 8.9 8.5 - 10.5 mg/dL    AST(SGOT) 10 (L) 12 - 45 U/L    ALT(SGPT) 7 2 - 50 U/L    Alkaline Phosphatase 91 30 - 99 U/L    Total Bilirubin <0.2 0.1 - 1.5 mg/dL    Albumin 3.4 3.2 - 4.9 g/dL    Total  Protein 6.8 6.0 - 8.2 g/dL    Globulin 3.4 1.9 - 3.5 g/dL    A-G Ratio 1.0 g/dL   LIPASE   Result Value Ref Range    Lipase 25 11 - 82 U/L   HCG QUANTITATIVE   Result Value Ref Range    Bhcg 45250.0 (H) 0.0 - 5.0 mIU/mL   ESTIMATED GFR   Result Value Ref Range    GFR (CKD-EPI) 151 >60 mL/min/1.73 m 2   DIFFERENTIAL MANUAL   Result Value Ref Range    Manual Diff Status PERFORMED    PERIPHERAL SMEAR REVIEW   Result Value Ref Range    Peripheral Smear Review see below    PLATELET ESTIMATE   Result Value Ref Range    Plt Estimation Normal    MORPHOLOGY   Result Value Ref Range    RBC Morphology Present     Poikilocytosis 1+     Ovalocytes 1+     Tear Drop Cells 1+    MONONUCLEOSIS TEST QUAL   Result Value Ref Range    Heterophile Screen Negative Negative   CoV-2, FLU A/B, and RSV by PCR (2-4 Hours CEPHEID) : Collect NP swab in VTM    Specimen: Respirate   Result Value Ref Range    SARS-CoV-2 Source NP Swab       All labs reviewed by me. Significant for mild leukocytosis, mild anemia, normal electrolytes, normal renal function, normal liver enzymes, normal bilirubin, lipase normal, elevated hCG, monotest negative    COURSE & MEDICAL DECISION MAKING  Nursing notes, VS, PMSFHx, labs, imaging, EKG reviewed in chart.    MDM: 4:52 AM Porfirio Keenan is a 21 y.o. female who presented with acute otitis media of the right ear that started last night.  She is also had a generalized URI flulike illness for the past 3 to 4 days including sore throat, some mild intermittent lymphadenopathy.  She is not vaccinates COVID or flu.  Her vital signs are fairly reassuring and she is afebrile here today.  Her physical exam shows acute otitis media of the right tympanic membrane, left is unremarkable.  Posterior pharynx shows bilateral symmetric tonsillar enlargement with a midline uvula, tolerating secretions, no signs of peritonsillar abscess.  No phonation changes, no signs of retropharyngeal abscess.  Labs were demonstrating a mild  leukocytosis that could be from her pregnancy versus reactive in nature, mild anemia, normal CMP, normal lipase.  Her pregnancy is elevated and she is known to be pregnant, denies vaginal bleeding or any abdominal pain or complications with the pregnancy.  Has close follow-up with her OB/GYN.  At this time patient will be started on Augmentin and Tylenol for her otitis media.  She had a monotest which was negative.  Strep test and COVID and flu test pending and [facility] but even if she is strep positive, the Augmentin for ear infection will cover it.  She verbalized understanding strict return precautions and outpatient follow-up plan and is amenable and verbalizes understanding.     The patient will return for new or worsening symptoms and is stable at the time of discharge.    The patient is referred to a primary physician for blood pressure management, diabetic screening, and for all other preventative health concerns.    DISPOSITION:  Patient will be discharged home in stable condition.    FOLLOW UP:  [facility], Emergency Dept  [address] [place] [id number] [phone]    As needed, If symptoms worsen    OUTPATIENT MEDICATIONS:  Discharge Medication List as of 6/28/2022  5:22 AM      START taking these medications    Details   acetaminophen (TYLENOL) 500 MG Tab Take 2 Tablets by mouth every 6 hours as needed for Moderate Pain for up to 4 days., Disp-20 Tablet, R-0, Normal      amoxicillin-clavulanate (AUGMENTIN) 875-125 MG Tab Take 1 Tablet by mouth 2 times a day for 10 days., Disp-20 Tablet, R-0, Normal            FINAL IMPRESSION  1. Acute otitis media, unspecified otitis media type Acute   2. Pharyngitis, unspecified etiology Acute       Aurora POSADA (Elmer), am scribing for, and in the presence of, Luca Burns.    Electronically signed by: Aurora Andrade), 6/28/2022    Luca POSADA personally performed  the services described in this documentation, as scribed by Aurora Andrea in my presence, and it is both accurate and complete.    The note accurately reflects work and decisions made by me.  Luca Burns  6/28/2022  5:47 AM

## 2022-06-28 NOTE — ED NOTES
Discharge instructions provided to pt.  ERP went over discharge instructions with pt.  Pt instructed to follow up with primary care provider and to  prescriptions from pharmacy.  Pt verbalized understanding.  Instructed to return to Emergency Department for new or worsening symptoms.

## 2022-06-28 NOTE — DISCHARGE INSTRUCTIONS
I haveIt looks like you have an infected ear infection called otitis media.  It will respond well to antibiotics.  Sent Augmentin which is antibiotic to the pharmacy for you.  I also want you to get 1000 mg of Tylenol 3 times a day or every 8 hours.  I want to follow-up for recheck with your PCP the next few days.  As we discussed your monotest, strep test and COVID test will come back later today I [facility].  If you are positive for COVID, please follow CDC guidelines.  Thank you for coming in today.

## 2022-08-03 ENCOUNTER — HOSPITAL ENCOUNTER (OUTPATIENT)
Facility: MEDICAL CENTER | Age: 22
End: 2022-08-03
Attending: PREVENTIVE MEDICINE
Payer: COMMERCIAL

## 2022-08-03 ENCOUNTER — NON-PROVIDER VISIT (OUTPATIENT)
Dept: OCCUPATIONAL MEDICINE | Facility: CLINIC | Age: 22
End: 2022-08-03

## 2022-08-03 DIAGNOSIS — Z02.89 ENCOUNTER FOR OCCUPATIONAL HEALTH ASSESSMENT: ICD-10-CM

## 2022-08-03 DIAGNOSIS — Z02.89 ENCOUNTER FOR OCCUPATIONAL HEALTH ASSESSMENT: Primary | ICD-10-CM

## 2022-08-03 PROCEDURE — 86480 TB TEST CELL IMMUN MEASURE: CPT | Performed by: PREVENTIVE MEDICINE

## 2022-08-03 PROCEDURE — 80305 DRUG TEST PRSMV DIR OPT OBS: CPT | Performed by: PREVENTIVE MEDICINE

## 2022-08-05 LAB
AMP AMPHETAMINE: NORMAL
COC COCAINE: NORMAL
GAMMA INTERFERON BACKGROUND BLD IA-ACNC: 0.06 IU/ML
INT CON NEG: NORMAL
INT CON POS: NORMAL
M TB IFN-G BLD-IMP: NEGATIVE
M TB IFN-G CD4+ BCKGRND COR BLD-ACNC: 0 IU/ML
MET METHAMPHETAMINES: NORMAL
MITOGEN IGNF BCKGRD COR BLD-ACNC: 5.2 IU/ML
OPI OPIATES: NORMAL
PCP PHENCYCLIDINE: NORMAL
POC DRUG COMMENT 753798-OCCUPATIONAL HEALTH: NORMAL
QFT TB2 - NIL TBQ2: 0 IU/ML
THC: NORMAL

## 2022-09-08 ENCOUNTER — HOSPITAL ENCOUNTER (EMERGENCY)
Facility: MEDICAL CENTER | Age: 22
End: 2022-09-08
Attending: OBSTETRICS & GYNECOLOGY | Admitting: OBSTETRICS & GYNECOLOGY
Payer: MEDICAID

## 2022-09-08 VITALS
BODY MASS INDEX: 28.25 KG/M2 | WEIGHT: 180 LBS | TEMPERATURE: 97.9 F | HEIGHT: 67 IN | OXYGEN SATURATION: 98 % | HEART RATE: 111 BPM | RESPIRATION RATE: 15 BRPM | DIASTOLIC BLOOD PRESSURE: 55 MMHG | SYSTOLIC BLOOD PRESSURE: 104 MMHG

## 2022-09-08 LAB
APPEARANCE UR: ABNORMAL
COLOR UR AUTO: YELLOW
GLUCOSE UR QL STRIP.AUTO: NEGATIVE MG/DL
KETONES UR QL STRIP.AUTO: NEGATIVE MG/DL
LEUKOCYTE ESTERASE UR QL STRIP.AUTO: ABNORMAL
NITRITE UR QL STRIP.AUTO: NEGATIVE
PH UR STRIP.AUTO: 7.5 [PH] (ref 5–8)
PROT UR QL STRIP: NEGATIVE MG/DL
RBC UR QL AUTO: ABNORMAL
SP GR UR STRIP.AUTO: 1.01 (ref 1–1.03)

## 2022-09-08 PROCEDURE — 302449 STATCHG TRIAGE ONLY (STATISTIC)

## 2022-09-08 PROCEDURE — 81002 URINALYSIS NONAUTO W/O SCOPE: CPT

## 2022-09-08 ASSESSMENT — FIBROSIS 4 INDEX: FIB4 SCORE: 0.25

## 2022-09-08 ASSESSMENT — PAIN SCALES - GENERAL: PAINLEVEL: 0 - NO PAIN

## 2022-09-08 NOTE — PROGRESS NOTES
Pt presents to L&D with complaints of dizziness/lightheadedness. Pt ambulated to [facility] 2 for assessment. Pt to give urine sample.     1128 TOCO and EFM applied, VSS. RN will take serial BP's for further evaluation. Pt reports +FM, denies LOF or VB. Pt states she has a history of preeclampsia with her last pregnancy but wasn't diagnosed until the end of her pregnancy. Pt denies any other s/s of preeclampsia including epigastric pain, excessive swelling or HA. Upon assessment, no swelling noted, no clonus and relexes +1. RN will update Dr. Gupta on pt status. See UA.     1155 Dr. Gupta updated on pt status. RN to obtain one additional BP and if WNL okay to discharge home with precautions. No need to get any further monitoring.     1205 BP all WNL, RN at bedside, pre-eclampsia precautions given and all questions answered. Pt encouraged to return if symptoms develop or worsen. Pt verbalized understanding.     1210 Pt discharged home in stable condition.

## 2022-11-09 ENCOUNTER — HOSPITAL ENCOUNTER (EMERGENCY)
Facility: MEDICAL CENTER | Age: 22
End: 2022-11-09
Attending: OBSTETRICS & GYNECOLOGY | Admitting: OBSTETRICS & GYNECOLOGY
Payer: COMMERCIAL

## 2022-11-09 VITALS
HEART RATE: 101 BPM | SYSTOLIC BLOOD PRESSURE: 131 MMHG | DIASTOLIC BLOOD PRESSURE: 76 MMHG | RESPIRATION RATE: 16 BRPM | TEMPERATURE: 97.8 F | OXYGEN SATURATION: 94 % | HEIGHT: 67 IN | WEIGHT: 209 LBS | BODY MASS INDEX: 32.8 KG/M2

## 2022-11-09 LAB
ALBUMIN SERPL BCP-MCNC: 3 G/DL (ref 3.2–4.9)
ALBUMIN/GLOB SERPL: 1 G/DL
ALP SERPL-CCNC: 165 U/L (ref 30–99)
ALT SERPL-CCNC: 9 U/L (ref 2–50)
ANION GAP SERPL CALC-SCNC: 12 MMOL/L (ref 7–16)
AST SERPL-CCNC: 20 U/L (ref 12–45)
BASOPHILS # BLD AUTO: 0.2 % (ref 0–1.8)
BASOPHILS # BLD: 0.02 K/UL (ref 0–0.12)
BILIRUB SERPL-MCNC: <0.2 MG/DL (ref 0.1–1.5)
BUN SERPL-MCNC: 5 MG/DL (ref 8–22)
CALCIUM SERPL-MCNC: 8.7 MG/DL (ref 8.5–10.5)
CHLORIDE SERPL-SCNC: 105 MMOL/L (ref 96–112)
CO2 SERPL-SCNC: 18 MMOL/L (ref 20–33)
CREAT SERPL-MCNC: 0.3 MG/DL (ref 0.5–1.4)
CREAT UR-MCNC: 114.25 MG/DL
EOSINOPHIL # BLD AUTO: 0.1 K/UL (ref 0–0.51)
EOSINOPHIL NFR BLD: 1 % (ref 0–6.9)
ERYTHROCYTE [DISTWIDTH] IN BLOOD BY AUTOMATED COUNT: 43.3 FL (ref 35.9–50)
GFR SERPLBLD CREATININE-BSD FMLA CKD-EPI: 154 ML/MIN/1.73 M 2
GLOBULIN SER CALC-MCNC: 3.1 G/DL (ref 1.9–3.5)
GLUCOSE SERPL-MCNC: 97 MG/DL (ref 65–99)
HCT VFR BLD AUTO: 32.2 % (ref 37–47)
HGB BLD-MCNC: 9.9 G/DL (ref 12–16)
IMM GRANULOCYTES # BLD AUTO: 0.1 K/UL (ref 0–0.11)
IMM GRANULOCYTES NFR BLD AUTO: 1 % (ref 0–0.9)
LYMPHOCYTES # BLD AUTO: 2.3 K/UL (ref 1–4.8)
LYMPHOCYTES NFR BLD: 22.6 % (ref 22–41)
MCH RBC QN AUTO: 22.3 PG (ref 27–33)
MCHC RBC AUTO-ENTMCNC: 30.7 G/DL (ref 33.6–35)
MCV RBC AUTO: 72.7 FL (ref 81.4–97.8)
MONOCYTES # BLD AUTO: 1.06 K/UL (ref 0–0.85)
MONOCYTES NFR BLD AUTO: 10.4 % (ref 0–13.4)
NEUTROPHILS # BLD AUTO: 6.61 K/UL (ref 2–7.15)
NEUTROPHILS NFR BLD: 64.8 % (ref 44–72)
NRBC # BLD AUTO: 0.05 K/UL
NRBC BLD-RTO: 0.5 /100 WBC
PLATELET # BLD AUTO: 243 K/UL (ref 164–446)
PMV BLD AUTO: 10.8 FL (ref 9–12.9)
POTASSIUM SERPL-SCNC: 3.8 MMOL/L (ref 3.6–5.5)
PROT SERPL-MCNC: 6.1 G/DL (ref 6–8.2)
PROT UR-MCNC: 40 MG/DL (ref 0–15)
PROT/CREAT UR: 350 MG/G (ref 10–107)
RBC # BLD AUTO: 4.43 M/UL (ref 4.2–5.4)
SODIUM SERPL-SCNC: 135 MMOL/L (ref 135–145)
WBC # BLD AUTO: 10.2 K/UL (ref 4.8–10.8)

## 2022-11-09 PROCEDURE — 82570 ASSAY OF URINE CREATININE: CPT

## 2022-11-09 PROCEDURE — 36415 COLL VENOUS BLD VENIPUNCTURE: CPT

## 2022-11-09 PROCEDURE — 59025 FETAL NON-STRESS TEST: CPT

## 2022-11-09 PROCEDURE — 302449 STATCHG TRIAGE ONLY (STATISTIC)

## 2022-11-09 PROCEDURE — 84156 ASSAY OF PROTEIN URINE: CPT

## 2022-11-09 PROCEDURE — 80053 COMPREHEN METABOLIC PANEL: CPT

## 2022-11-09 PROCEDURE — 85025 COMPLETE CBC W/AUTO DIFF WBC: CPT

## 2022-11-09 ASSESSMENT — FIBROSIS 4 INDEX: FIB4 SCORE: 0.25

## 2022-11-09 ASSESSMENT — PAIN SCALES - GENERAL: PAINLEVEL: 0 - NO PAIN

## 2022-11-10 NOTE — PROGRESS NOTES
at 34+5 presents to L+D w/ c/o abdominal tightening and a headache. Pt reports a history of pre-e and has had proteinuria at routine prentals. Monitors applied x2, VSS. BP's set to cycle.      report given to Dr. Fox, orders received

## 2022-11-10 NOTE — PROGRESS NOTES
2200: Discharge order received.     2205: Discharge instructions discussed with pt and FOB. Labor precautions given. Pt with follow up appointment tomorrow. All questions answered.

## 2022-11-10 NOTE — DISCHARGE INSTRUCTIONS
General Instructions:  If you think you are in labor, time contractions (lying on your left side) from the beginning of one contraction to the beginning of the next contraction for at least one hour.  Increase fluid intake: you should consume 10-12 8 oz glasses of non-caffeinated fluid per day.  Report any pressure or burning on urination to your physician.  Monitor fetal movement: If you notice an absence or decrease in fetal movement, drink a large glass of water and rest on your side.  If there is no increase in movement, call your physician or go to the hospital for further evaluation.  Report any sudden, sharp abdominal pain.  Report any bleeding.  Spotting or pinkish discharge is normal after vaginal exam.  You may also spot after sexual intercourse.    High Blood Pressure:  Rest on your right or left side.  Report any severe headaches, dizziness, blurred vision or spots before your eyes.  Report excessive swelling of your hands, face or feet.  Report epigastric pain (upper abdominal pain)    Other Instructions:  Please carefully review your entire AFTER VISIT SUMMARY document for all discharge instructions.

## 2022-11-14 ENCOUNTER — HOSPITAL ENCOUNTER (EMERGENCY)
Facility: MEDICAL CENTER | Age: 22
End: 2022-11-14
Attending: OBSTETRICS & GYNECOLOGY | Admitting: OBSTETRICS & GYNECOLOGY
Payer: COMMERCIAL

## 2022-11-14 VITALS
TEMPERATURE: 98 F | DIASTOLIC BLOOD PRESSURE: 70 MMHG | HEIGHT: 68 IN | BODY MASS INDEX: 31.83 KG/M2 | HEART RATE: 101 BPM | SYSTOLIC BLOOD PRESSURE: 126 MMHG | WEIGHT: 210 LBS

## 2022-11-14 LAB
APPEARANCE UR: CLEAR
COLOR UR AUTO: YELLOW
GLUCOSE UR QL STRIP.AUTO: NEGATIVE MG/DL
KETONES UR QL STRIP.AUTO: NEGATIVE MG/DL
LEUKOCYTE ESTERASE UR QL STRIP.AUTO: ABNORMAL
NITRITE UR QL STRIP.AUTO: NEGATIVE
PH UR STRIP.AUTO: 6.5 [PH] (ref 5–8)
PROT UR QL STRIP: 30 MG/DL
RBC UR QL AUTO: ABNORMAL
SP GR UR STRIP.AUTO: 1.02 (ref 1–1.03)

## 2022-11-14 PROCEDURE — 59025 FETAL NON-STRESS TEST: CPT

## 2022-11-14 PROCEDURE — 81002 URINALYSIS NONAUTO W/O SCOPE: CPT

## 2022-11-14 PROCEDURE — 302449 STATCHG TRIAGE ONLY (STATISTIC)

## 2022-11-14 ASSESSMENT — PAIN SCALES - GENERAL: PAINLEVEL: 0 - NO PAIN

## 2022-11-14 ASSESSMENT — FIBROSIS 4 INDEX: FIB4 SCORE: 0.58

## 2022-11-14 NOTE — PROGRESS NOTES
EDC - 35 3/7    Pt presents to L&D with c/o occasional abdominal tightening and pelvic pressure since last night. Pt reports good fetal movement, denies vaginal bleeding and leaking of fluid. EFM/TOCO applied, vital signs taken. POC UA done.     1338- Report to Dr Becerra. Order received for SVE and discharge home if cervix closed. Will update MD if cervix dilated.     1400- SVE by RN- closed/thick.     1415- Discharge instructions given. Pt instructed to return to hospital for any leaking of fluid, decreased fetal movement, signs of labor. Pt verbalized understanding.

## 2022-11-15 NOTE — CONSULTS
DATE OF SERVICE:  2022     NST READING WITH CONSULTATION     This is a 22-year-old female  2, para 1 at 35-3/7 weeks' gestational   age.  She presented to the hospital with complaints of abdominal pain and   pelvic pressure since last night.  She reported positive fetal movement and   denied any vaginal bleeding or leakage of fluid.  She was observed here in   triage for approximately 1-1/2 hours and an occasional uterine contraction was   noted on the tocometry.  She had a category 1 reactive tracing in the 140s.    Her cervical exam was closed, thick and high.  She is now being discharged   home in stable condition with instructions to follow up at her next scheduled   appointment.        ______________________________  Corinne E. Capurro, MD [facility]    DD:  2022 15:58  DT:  2022 19:40    Job#:  [id number]

## 2023-07-08 ENCOUNTER — APPOINTMENT (OUTPATIENT)
Dept: RADIOLOGY | Facility: MEDICAL CENTER | Age: 23
End: 2023-07-08
Attending: EMERGENCY MEDICINE
Payer: MEDICAID

## 2023-07-08 ENCOUNTER — HOSPITAL ENCOUNTER (OUTPATIENT)
Facility: MEDICAL CENTER | Age: 23
End: 2023-07-09
Attending: EMERGENCY MEDICINE | Admitting: INTERNAL MEDICINE
Payer: MEDICAID

## 2023-07-08 ENCOUNTER — APPOINTMENT (OUTPATIENT)
Dept: RADIOLOGY | Facility: MEDICAL CENTER | Age: 23
End: 2023-07-08
Attending: INTERNAL MEDICINE
Payer: MEDICAID

## 2023-07-08 DIAGNOSIS — R00.0 TACHYCARDIA: ICD-10-CM

## 2023-07-08 DIAGNOSIS — R51.9 ACUTE INTRACTABLE HEADACHE, UNSPECIFIED HEADACHE TYPE: ICD-10-CM

## 2023-07-08 PROBLEM — R65.10 SIRS (SYSTEMIC INFLAMMATORY RESPONSE SYNDROME) (HCC): Status: ACTIVE | Noted: 2023-07-08

## 2023-07-08 PROBLEM — R10.9 ABDOMINAL PAIN: Status: ACTIVE | Noted: 2023-07-08

## 2023-07-08 PROBLEM — N39.0 UTI (URINARY TRACT INFECTION): Status: ACTIVE | Noted: 2023-07-08

## 2023-07-08 LAB
ALBUMIN SERPL BCP-MCNC: 4.4 G/DL (ref 3.2–4.9)
ALBUMIN/GLOB SERPL: 1.3 G/DL
ALP SERPL-CCNC: 147 U/L (ref 30–99)
ALT SERPL-CCNC: 18 U/L (ref 2–50)
ANION GAP SERPL CALC-SCNC: 13 MMOL/L (ref 7–16)
APPEARANCE UR: ABNORMAL
AST SERPL-CCNC: 15 U/L (ref 12–45)
BACTERIA #/AREA URNS HPF: ABNORMAL /HPF
BASOPHILS # BLD AUTO: 0.1 % (ref 0–1.8)
BASOPHILS # BLD: 0.02 K/UL (ref 0–0.12)
BILIRUB SERPL-MCNC: 0.2 MG/DL (ref 0.1–1.5)
BILIRUB UR QL STRIP.AUTO: NEGATIVE
BUN SERPL-MCNC: 11 MG/DL (ref 8–22)
BURR CELLS/RBC NFR CSF MANUAL: 0 %
CALCIUM ALBUM COR SERPL-MCNC: 8.7 MG/DL (ref 8.5–10.5)
CALCIUM SERPL-MCNC: 9 MG/DL (ref 8.5–10.5)
CHLORIDE SERPL-SCNC: 99 MMOL/L (ref 96–112)
CLARITY CSF: CLEAR
CO2 SERPL-SCNC: 21 MMOL/L (ref 20–33)
COLOR CSF: COLORLESS
COLOR SPUN CSF: COLORLESS
COLOR UR: YELLOW
CREAT SERPL-MCNC: 0.47 MG/DL (ref 0.5–1.4)
CSF COMMENTS 1658: NORMAL
EOSINOPHIL # BLD AUTO: 0.02 K/UL (ref 0–0.51)
EOSINOPHIL NFR BLD: 0.1 % (ref 0–6.9)
EPI CELLS #/AREA URNS HPF: ABNORMAL /HPF
ERYTHROCYTE [DISTWIDTH] IN BLOOD BY AUTOMATED COUNT: 40.3 FL (ref 35.9–50)
FLUAV RNA SPEC QL NAA+PROBE: NEGATIVE
FLUBV RNA SPEC QL NAA+PROBE: NEGATIVE
GFR SERPLBLD CREATININE-BSD FMLA CKD-EPI: 138 ML/MIN/1.73 M 2
GLOBULIN SER CALC-MCNC: 3.4 G/DL (ref 1.9–3.5)
GLUCOSE CSF-MCNC: 61 MG/DL (ref 40–80)
GLUCOSE SERPL-MCNC: 120 MG/DL (ref 65–99)
GLUCOSE UR STRIP.AUTO-MCNC: NEGATIVE MG/DL
GRAM STN SPEC: NORMAL
HCG SERPL QL: NEGATIVE
HCT VFR BLD AUTO: 41.5 % (ref 37–47)
HGB BLD-MCNC: 13.7 G/DL (ref 12–16)
HYALINE CASTS #/AREA URNS LPF: ABNORMAL /LPF
IMM GRANULOCYTES # BLD AUTO: 0.07 K/UL (ref 0–0.11)
IMM GRANULOCYTES NFR BLD AUTO: 0.5 % (ref 0–0.9)
KETONES UR STRIP.AUTO-MCNC: ABNORMAL MG/DL
LACTATE SERPL-SCNC: 1.2 MMOL/L (ref 0.5–2)
LEUKOCYTE ESTERASE UR QL STRIP.AUTO: ABNORMAL
LIPASE SERPL-CCNC: 23 U/L (ref 11–82)
LYMPHOCYTES # BLD AUTO: 1.31 K/UL (ref 1–4.8)
LYMPHOCYTES NFR BLD: 8.7 % (ref 22–41)
MCH RBC QN AUTO: 26.4 PG (ref 27–33)
MCHC RBC AUTO-ENTMCNC: 33 G/DL (ref 32.2–35.5)
MCV RBC AUTO: 80 FL (ref 81.4–97.8)
MICRO URNS: ABNORMAL
MONOCYTES # BLD AUTO: 1.01 K/UL (ref 0–0.85)
MONOCYTES NFR BLD AUTO: 6.7 % (ref 0–13.4)
NEUTROPHILS # BLD AUTO: 12.57 K/UL (ref 1.82–7.42)
NEUTROPHILS NFR BLD: 83.9 % (ref 44–72)
NITRITE UR QL STRIP.AUTO: NEGATIVE
NRBC # BLD AUTO: 0 K/UL
NRBC BLD-RTO: 0 /100 WBC (ref 0–0.2)
NUC CELL # CSF: 1 CELLS/UL (ref 0–10)
PH UR STRIP.AUTO: 5.5 [PH] (ref 5–8)
PLATELET # BLD AUTO: 245 K/UL (ref 164–446)
PMV BLD AUTO: 10.6 FL (ref 9–12.9)
POTASSIUM SERPL-SCNC: 3.4 MMOL/L (ref 3.6–5.5)
PROT CSF-MCNC: 19 MG/DL (ref 15–45)
PROT SERPL-MCNC: 7.8 G/DL (ref 6–8.2)
PROT UR QL STRIP: NEGATIVE MG/DL
RBC # BLD AUTO: 5.19 M/UL (ref 4.2–5.4)
RBC # CSF: <1 CELLS/UL
RBC # URNS HPF: ABNORMAL /HPF
RBC UR QL AUTO: ABNORMAL
RSV RNA SPEC QL NAA+PROBE: NEGATIVE
SARS-COV-2 RNA RESP QL NAA+PROBE: NOTDETECTED
SIGNIFICANT IND 70042: NORMAL
SITE SITE: NORMAL
SODIUM SERPL-SCNC: 133 MMOL/L (ref 135–145)
SOURCE SOURCE: NORMAL
SP GR UR STRIP.AUTO: 1.03
SPECIMEN SOURCE: NORMAL
SPECIMEN VOL CSF: 6 ML
TUBE # CSF: 4
TUBE # CSF: NORMAL
UROBILINOGEN UR STRIP.AUTO-MCNC: 0.2 MG/DL
WBC # BLD AUTO: 15 K/UL (ref 4.8–10.8)
WBC #/AREA URNS HPF: ABNORMAL /HPF

## 2023-07-08 PROCEDURE — 99223 1ST HOSP IP/OBS HIGH 75: CPT | Performed by: INTERNAL MEDICINE

## 2023-07-08 PROCEDURE — 83605 ASSAY OF LACTIC ACID: CPT

## 2023-07-08 PROCEDURE — 99285 EMERGENCY DEPT VISIT HI MDM: CPT

## 2023-07-08 PROCEDURE — 700117 HCHG RX CONTRAST REV CODE 255: Performed by: EMERGENCY MEDICINE

## 2023-07-08 PROCEDURE — 96376 TX/PRO/DX INJ SAME DRUG ADON: CPT

## 2023-07-08 PROCEDURE — 83690 ASSAY OF LIPASE: CPT

## 2023-07-08 PROCEDURE — 87205 SMEAR GRAM STAIN: CPT

## 2023-07-08 PROCEDURE — 87040 BLOOD CULTURE FOR BACTERIA: CPT | Mod: 91

## 2023-07-08 PROCEDURE — 87070 CULTURE OTHR SPECIMN AEROBIC: CPT

## 2023-07-08 PROCEDURE — 87899 AGENT NOS ASSAY W/OPTIC: CPT | Mod: 91

## 2023-07-08 PROCEDURE — 62270 DX LMBR SPI PNXR: CPT

## 2023-07-08 PROCEDURE — 700111 HCHG RX REV CODE 636 W/ 250 OVERRIDE (IP): Mod: JZ,UD | Performed by: EMERGENCY MEDICINE

## 2023-07-08 PROCEDURE — 71045 X-RAY EXAM CHEST 1 VIEW: CPT

## 2023-07-08 PROCEDURE — 87045 FECES CULTURE AEROBIC BACT: CPT

## 2023-07-08 PROCEDURE — 93005 ELECTROCARDIOGRAM TRACING: CPT | Performed by: EMERGENCY MEDICINE

## 2023-07-08 PROCEDURE — 36415 COLL VENOUS BLD VENIPUNCTURE: CPT

## 2023-07-08 PROCEDURE — 700111 HCHG RX REV CODE 636 W/ 250 OVERRIDE (IP): Mod: JZ,UD | Performed by: INTERNAL MEDICINE

## 2023-07-08 PROCEDURE — 700105 HCHG RX REV CODE 258: Mod: JZ,UD | Performed by: EMERGENCY MEDICINE

## 2023-07-08 PROCEDURE — 700117 HCHG RX CONTRAST REV CODE 255: Mod: UD | Performed by: INTERNAL MEDICINE

## 2023-07-08 PROCEDURE — 84703 CHORIONIC GONADOTROPIN ASSAY: CPT

## 2023-07-08 PROCEDURE — 96375 TX/PRO/DX INJ NEW DRUG ADDON: CPT

## 2023-07-08 PROCEDURE — 70496 CT ANGIOGRAPHY HEAD: CPT

## 2023-07-08 PROCEDURE — 700105 HCHG RX REV CODE 258: Mod: UD | Performed by: INTERNAL MEDICINE

## 2023-07-08 PROCEDURE — 96374 THER/PROPH/DIAG INJ IV PUSH: CPT

## 2023-07-08 PROCEDURE — G0378 HOSPITAL OBSERVATION PER HR: HCPCS

## 2023-07-08 PROCEDURE — 89051 BODY FLUID CELL COUNT: CPT

## 2023-07-08 PROCEDURE — 81001 URINALYSIS AUTO W/SCOPE: CPT

## 2023-07-08 PROCEDURE — 96365 THER/PROPH/DIAG IV INF INIT: CPT

## 2023-07-08 PROCEDURE — C9803 HOPD COVID-19 SPEC COLLECT: HCPCS | Performed by: EMERGENCY MEDICINE

## 2023-07-08 PROCEDURE — 84157 ASSAY OF PROTEIN OTHER: CPT

## 2023-07-08 PROCEDURE — 82945 GLUCOSE OTHER FLUID: CPT

## 2023-07-08 PROCEDURE — 80053 COMPREHEN METABOLIC PANEL: CPT

## 2023-07-08 PROCEDURE — 700111 HCHG RX REV CODE 636 W/ 250 OVERRIDE (IP): Mod: UD

## 2023-07-08 PROCEDURE — 87086 URINE CULTURE/COLONY COUNT: CPT

## 2023-07-08 PROCEDURE — 74177 CT ABD & PELVIS W/CONTRAST: CPT

## 2023-07-08 PROCEDURE — 96366 THER/PROPH/DIAG IV INF ADDON: CPT

## 2023-07-08 PROCEDURE — 0241U HCHG SARS-COV-2 COVID-19 NFCT DS RESP RNA 4 TRGT MIC: CPT

## 2023-07-08 PROCEDURE — 85025 COMPLETE CBC W/AUTO DIFF WBC: CPT

## 2023-07-08 RX ORDER — POLYETHYLENE GLYCOL 3350 17 G/17G
1 POWDER, FOR SOLUTION ORAL
Status: DISCONTINUED | OUTPATIENT
Start: 2023-07-08 | End: 2023-07-09 | Stop reason: HOSPADM

## 2023-07-08 RX ORDER — SODIUM CHLORIDE, SODIUM LACTATE, POTASSIUM CHLORIDE, CALCIUM CHLORIDE 600; 310; 30; 20 MG/100ML; MG/100ML; MG/100ML; MG/100ML
1000 INJECTION, SOLUTION INTRAVENOUS ONCE
Status: COMPLETED | OUTPATIENT
Start: 2023-07-08 | End: 2023-07-08

## 2023-07-08 RX ORDER — MAGNESIUM SULFATE HEPTAHYDRATE 40 MG/ML
2 INJECTION, SOLUTION INTRAVENOUS ONCE
Status: COMPLETED | OUTPATIENT
Start: 2023-07-08 | End: 2023-07-08

## 2023-07-08 RX ORDER — ONDANSETRON 4 MG/1
4 TABLET, ORALLY DISINTEGRATING ORAL ONCE
Status: COMPLETED | OUTPATIENT
Start: 2023-07-08 | End: 2023-07-08

## 2023-07-08 RX ORDER — KETOROLAC TROMETHAMINE 30 MG/ML
15 INJECTION, SOLUTION INTRAMUSCULAR; INTRAVENOUS EVERY 6 HOURS PRN
Status: DISCONTINUED | OUTPATIENT
Start: 2023-07-08 | End: 2023-07-09 | Stop reason: HOSPADM

## 2023-07-08 RX ORDER — AMOXICILLIN 250 MG
2 CAPSULE ORAL 2 TIMES DAILY
Status: DISCONTINUED | OUTPATIENT
Start: 2023-07-08 | End: 2023-07-09 | Stop reason: HOSPADM

## 2023-07-08 RX ORDER — SODIUM CHLORIDE 9 MG/ML
1000 INJECTION, SOLUTION INTRAVENOUS ONCE
Status: COMPLETED | OUTPATIENT
Start: 2023-07-08 | End: 2023-07-08

## 2023-07-08 RX ORDER — IBUPROFEN 800 MG/1
800 TABLET ORAL
COMMUNITY

## 2023-07-08 RX ORDER — KETOROLAC TROMETHAMINE 30 MG/ML
30 INJECTION, SOLUTION INTRAMUSCULAR; INTRAVENOUS ONCE
Status: COMPLETED | OUTPATIENT
Start: 2023-07-08 | End: 2023-07-08

## 2023-07-08 RX ORDER — DIPHENHYDRAMINE HYDROCHLORIDE 50 MG/ML
25 INJECTION INTRAMUSCULAR; INTRAVENOUS ONCE
Status: DISCONTINUED | OUTPATIENT
Start: 2023-07-08 | End: 2023-07-08

## 2023-07-08 RX ORDER — SODIUM CHLORIDE 9 MG/ML
INJECTION, SOLUTION INTRAVENOUS CONTINUOUS
Status: DISCONTINUED | OUTPATIENT
Start: 2023-07-08 | End: 2023-07-09 | Stop reason: HOSPADM

## 2023-07-08 RX ORDER — LABETALOL HYDROCHLORIDE 5 MG/ML
10 INJECTION, SOLUTION INTRAVENOUS EVERY 4 HOURS PRN
Status: DISCONTINUED | OUTPATIENT
Start: 2023-07-08 | End: 2023-07-09 | Stop reason: HOSPADM

## 2023-07-08 RX ORDER — MORPHINE SULFATE 4 MG/ML
4 INJECTION INTRAVENOUS ONCE
Status: COMPLETED | OUTPATIENT
Start: 2023-07-08 | End: 2023-07-08

## 2023-07-08 RX ORDER — METOCLOPRAMIDE HYDROCHLORIDE 5 MG/ML
10 INJECTION INTRAMUSCULAR; INTRAVENOUS ONCE
Status: COMPLETED | OUTPATIENT
Start: 2023-07-08 | End: 2023-07-08

## 2023-07-08 RX ORDER — LEVONORGESTREL 52 MG/1
1 INTRAUTERINE DEVICE INTRAUTERINE ONCE
COMMUNITY

## 2023-07-08 RX ORDER — ACETAMINOPHEN 325 MG/1
650 TABLET ORAL EVERY 6 HOURS PRN
Status: DISCONTINUED | OUTPATIENT
Start: 2023-07-08 | End: 2023-07-09 | Stop reason: HOSPADM

## 2023-07-08 RX ORDER — BISACODYL 10 MG
10 SUPPOSITORY, RECTAL RECTAL
Status: DISCONTINUED | OUTPATIENT
Start: 2023-07-08 | End: 2023-07-09 | Stop reason: HOSPADM

## 2023-07-08 RX ADMIN — FENTANYL CITRATE 50 MCG: 50 INJECTION, SOLUTION INTRAMUSCULAR; INTRAVENOUS at 14:25

## 2023-07-08 RX ADMIN — IOHEXOL 60 ML: 350 INJECTION, SOLUTION INTRAVENOUS at 12:15

## 2023-07-08 RX ADMIN — MORPHINE SULFATE 4 MG: 4 INJECTION INTRAVENOUS at 13:03

## 2023-07-08 RX ADMIN — SODIUM CHLORIDE: 9 INJECTION, SOLUTION INTRAVENOUS at 18:39

## 2023-07-08 RX ADMIN — KETOROLAC TROMETHAMINE 15 MG: 30 INJECTION, SOLUTION INTRAMUSCULAR; INTRAVENOUS at 18:26

## 2023-07-08 RX ADMIN — IOHEXOL 100 ML: 350 INJECTION, SOLUTION INTRAVENOUS at 17:46

## 2023-07-08 RX ADMIN — KETOROLAC TROMETHAMINE 30 MG: 30 INJECTION, SOLUTION INTRAMUSCULAR; INTRAVENOUS at 08:46

## 2023-07-08 RX ADMIN — ONDANSETRON 4 MG: 4 TABLET, ORALLY DISINTEGRATING ORAL at 04:53

## 2023-07-08 RX ADMIN — FENTANYL CITRATE 50 MCG: 50 INJECTION, SOLUTION INTRAMUSCULAR; INTRAVENOUS at 07:35

## 2023-07-08 RX ADMIN — SODIUM CHLORIDE 1000 ML: 9 INJECTION, SOLUTION INTRAVENOUS at 08:49

## 2023-07-08 RX ADMIN — SODIUM CHLORIDE, POTASSIUM CHLORIDE, SODIUM LACTATE AND CALCIUM CHLORIDE 1000 ML: 600; 310; 30; 20 INJECTION, SOLUTION INTRAVENOUS at 07:35

## 2023-07-08 RX ADMIN — MAGNESIUM SULFATE HEPTAHYDRATE 2 G: 2 INJECTION, SOLUTION INTRAVENOUS at 18:40

## 2023-07-08 RX ADMIN — CEFTRIAXONE SODIUM 1000 MG: 10 INJECTION, POWDER, FOR SOLUTION INTRAVENOUS at 18:27

## 2023-07-08 RX ADMIN — METOCLOPRAMIDE 10 MG: 5 INJECTION, SOLUTION INTRAMUSCULAR; INTRAVENOUS at 18:26

## 2023-07-08 ASSESSMENT — LIFESTYLE VARIABLES
AVERAGE NUMBER OF DAYS PER WEEK YOU HAVE A DRINK CONTAINING ALCOHOL: 0
TOTAL SCORE: 0
ON A TYPICAL DAY WHEN YOU DRINK ALCOHOL HOW MANY DRINKS DO YOU HAVE: 0
CONSUMPTION TOTAL: NEGATIVE
TOTAL SCORE: 0
ALCOHOL_USE: NO
HAVE YOU EVER FELT YOU SHOULD CUT DOWN ON YOUR DRINKING: NO
EVER HAD A DRINK FIRST THING IN THE MORNING TO STEADY YOUR NERVES TO GET RID OF A HANGOVER: NO
HOW MANY TIMES IN THE PAST YEAR HAVE YOU HAD 5 OR MORE DRINKS IN A DAY: 0
EVER FELT BAD OR GUILTY ABOUT YOUR DRINKING: NO
TOTAL SCORE: 0
HAVE PEOPLE ANNOYED YOU BY CRITICIZING YOUR DRINKING: NO

## 2023-07-08 ASSESSMENT — PATIENT HEALTH QUESTIONNAIRE - PHQ9
SUM OF ALL RESPONSES TO PHQ9 QUESTIONS 1 AND 2: 0
2. FEELING DOWN, DEPRESSED, IRRITABLE, OR HOPELESS: NOT AT ALL
1. LITTLE INTEREST OR PLEASURE IN DOING THINGS: NOT AT ALL

## 2023-07-08 ASSESSMENT — PAIN DESCRIPTION - PAIN TYPE
TYPE: ACUTE PAIN

## 2023-07-08 ASSESSMENT — FIBROSIS 4 INDEX
FIB4 SCORE: .3030457633656632247
FIB4 SCORE: .3030457633656632247
FIB4 SCORE: 0.58

## 2023-07-08 ASSESSMENT — COPD QUESTIONNAIRES
COPD SCREENING SCORE: 0
DO YOU EVER COUGH UP ANY MUCUS OR PHLEGM?: NO/ONLY WITH OCCASIONAL COLDS OR INFECTIONS
DURING THE PAST 4 WEEKS HOW MUCH DID YOU FEEL SHORT OF BREATH: NONE/LITTLE OF THE TIME
HAVE YOU SMOKED AT LEAST 100 CIGARETTES IN YOUR ENTIRE LIFE: NO/DON'T KNOW

## 2023-07-08 ASSESSMENT — ENCOUNTER SYMPTOMS
NAUSEA: 1
ABDOMINAL PAIN: 1
VOMITING: 1
DIARRHEA: 1

## 2023-07-08 NOTE — ASSESSMENT & PLAN NOTE
Likely related to viral illness versus migraine  Patient will be given migraine cocktail with IV Toradol, IV Reglan, IV magnesium  LP performed, negative for meningitis  CT head negative for acute intracranial malady  Tylenol as needed

## 2023-07-08 NOTE — ASSESSMENT & PLAN NOTE
Likely secondary to viral gastroenteritis  Started on IV fluid hydration  Supportive care  Pain control with Toradol  IV Zofran as needed  Check CT abdomen pelvis with IV contrast for further evaluation

## 2023-07-08 NOTE — ED NOTES
Assumed care of patient, bedside report received from off coming RN.  Introduced self as pt RN, POC discussed, call light in reach, Fall risk interventions in place.

## 2023-07-08 NOTE — ED NOTES
Telephone report to MORIS Clinton. Patient to floor via transport. Patient in stable condition with no acute changes. Chart, medications, and all belongings with patient.

## 2023-07-08 NOTE — PROGRESS NOTES
Report received from MORIS Sanderson.  Assumed care of patient.  Assessment complete.  Plan of care gone over with the patient and all concerns addressed.  Patient sitting up in bed with family at the bedside.  Patient A & O x 4.  No apparent signs of distress.  Safety precautions in place.  Patient educated to call for assistance.  Hourly rounding in place.

## 2023-07-08 NOTE — ED NOTES
Pt ambulatory from lobby to room. Pt changed into gown and connected to monitors. Urine collected and sent. Call light within pt reach.

## 2023-07-08 NOTE — ASSESSMENT & PLAN NOTE
With tachycardia and leukocytosis  IV fluid hydration with NS  Check blood and stool culture  LP performed, negative for meningitis  Influenza and COVID-negative  Continuous cardiac monitoring for persistent tachycardia

## 2023-07-08 NOTE — ED NOTES
Pt resting with eyes closed and even, unlabored respirations on RA. Updated mother at bedside on POC.

## 2023-07-08 NOTE — ED PROVIDER NOTES
ED Provider Note    CHIEF COMPLAINT  Chief Complaint   Patient presents with    Abdominal Pain     C/o lower abdominal pain x 2 days. Described as sharp pains.    Nausea/Vomiting/Diarrhea     Started yesterday. Described diarrhea as watery.    Headache     Since yesterday       EXTERNAL RECORDS REVIEWED    CT imaging from August 2021 shows right hydronephrosis mild fat stranding surrounding the partially distended bladder, possibly cystitis or recently passed stone.    Patient was seen in the emergency department June 28 for ear pain and swollen glands, diagnosed with acute otitis media.    Prior to that patient was seen in the ED for vaginal bleeding in the setting of pregnancy.  This was December 2021.    HPI/ROS  LIMITATION TO HISTORY   Select: : None  OUTSIDE HISTORIAN(S):  None    Porfirio Keenan is a 22 y.o. female who presents to the emergency department with a chief complaint of abdominal pain.  She states it started yesterday with watery diarrhea.  She complains of lower abdominal pain and occasional sharp crampy pain.  She subsequently developed a headache.  She states she previously had a similar headache when he had preeclampsia.  She is 7 months postpartum.  She reports a temperature of 100.  She denies the possibility of pregnancy.  She had an IUD placed at her follow-up OB appointment after delivery and has not had a period since she delivered.  She also reports a history of anemia during her pregnancy and received iron infusions but no transfusions of blood.  No prior surgery.  She was treated with Zofran ODT in triage.  No allergies.    PAST MEDICAL HISTORY   has a past medical history of Anemia, Breath shortness, and Cold.    SURGICAL HISTORY  patient denies any surgical history    FAMILY HISTORY  History reviewed. No pertinent family history.    SOCIAL HISTORY  Social History     Tobacco Use    Smoking status: Never    Smokeless tobacco: Never   Vaping Use    Vaping Use: Never used   Substance and  "Sexual Activity    Alcohol use: No    Drug use: No    Sexual activity: Not on file       CURRENT MEDICATIONS  Home Medications       Reviewed by Oleg Spangler (Pharmacy Tech) on 07/08/23 at 1257  Med List Status: Complete     Medication Last Dose Status   ibuprofen (MOTRIN) 800 MG Tab 7/8/2023 Active   levonorgestrel (MIRENA, 52 MG,) 20 MCG/DAY IUD 2/2023 placed Active                    ALLERGIES  No Known Allergies    PHYSICAL EXAM  VITAL SIGNS: /65   Pulse (!) 112   Temp 36.9 °C (98.5 °F) (Temporal)   Resp 16   Ht 1.702 m (5' 7\")   Wt 80.7 kg (177 lb 14.6 oz)   SpO2 92%   Breastfeeding Yes   BMI 27.86 kg/m²    Vitals reviewed.  Constitutional: Patient is oriented to person, place, and time. Appears well-developed and well-nourished.  Moderate distress.    Head: Normocephalic and atraumatic.   Ears: Normal external ears bilaterally.   Mouth/Throat: Oropharynx is clear, dry mucous membranes.  Eyes: Conjunctivae are normal. Pupils are equal, round.  Neck: Normal range of motion. Neck supple.  No meningeal signs.  Cardiovascular: Tachycardia, regular rhythm and normal heart sounds. Normal peripheral pulses.  Pulmonary/Chest: Effort normal and breath sounds normal. No respiratory distress, no wheezes, rhonchi, or rales. No chest wall tenderness.  Abdominal: Soft. Bowel sounds are normal. There is mild diffuse tenderness, rebound or guarding, or peritoneal signs. No CVA tenderness.  Musculoskeletal: No edema and no tenderness.  Neurological: No cranial nerve deficits, on passive examination. Normal motor and sensory exam. No focal deficits.   Skin: Skin is warm and dry. No erythema. No pallor.   Psychiatric: Patient has a normal mood and affect.     DIAGNOSTIC STUDIES / PROCEDURES    LABS  Results for orders placed or performed during the hospital encounter of 07/08/23   CBC WITH DIFFERENTIAL   Result Value Ref Range    WBC 15.0 (H) 4.8 - 10.8 K/uL    RBC 5.19 4.20 - 5.40 M/uL    Hemoglobin 13.7 " 12.0 - 16.0 g/dL    Hematocrit 41.5 37.0 - 47.0 %    MCV 80.0 (L) 81.4 - 97.8 fL    MCH 26.4 (L) 27.0 - 33.0 pg    MCHC 33.0 32.2 - 35.5 g/dL    RDW 40.3 35.9 - 50.0 fL    Platelet Count 245 164 - 446 K/uL    MPV 10.6 9.0 - 12.9 fL    Neutrophils-Polys 83.90 (H) 44.00 - 72.00 %    Lymphocytes 8.70 (L) 22.00 - 41.00 %    Monocytes 6.70 0.00 - 13.40 %    Eosinophils 0.10 0.00 - 6.90 %    Basophils 0.10 0.00 - 1.80 %    Immature Granulocytes 0.50 0.00 - 0.90 %    Nucleated RBC 0.00 0.00 - 0.20 /100 WBC    Neutrophils (Absolute) 12.57 (H) 1.82 - 7.42 K/uL    Lymphs (Absolute) 1.31 1.00 - 4.80 K/uL    Monos (Absolute) 1.01 (H) 0.00 - 0.85 K/uL    Eos (Absolute) 0.02 0.00 - 0.51 K/uL    Baso (Absolute) 0.02 0.00 - 0.12 K/uL    Immature Granulocytes (abs) 0.07 0.00 - 0.11 K/uL    NRBC (Absolute) 0.00 K/uL   COMP METABOLIC PANEL   Result Value Ref Range    Sodium 133 (L) 135 - 145 mmol/L    Potassium 3.4 (L) 3.6 - 5.5 mmol/L    Chloride 99 96 - 112 mmol/L    Co2 21 20 - 33 mmol/L    Anion Gap 13.0 7.0 - 16.0    Glucose 120 (H) 65 - 99 mg/dL    Bun 11 8 - 22 mg/dL    Creatinine 0.47 (L) 0.50 - 1.40 mg/dL    Calcium 9.0 8.5 - 10.5 mg/dL    AST(SGOT) 15 12 - 45 U/L    ALT(SGPT) 18 2 - 50 U/L    Alkaline Phosphatase 147 (H) 30 - 99 U/L    Total Bilirubin 0.2 0.1 - 1.5 mg/dL    Albumin 4.4 3.2 - 4.9 g/dL    Total Protein 7.8 6.0 - 8.2 g/dL    Globulin 3.4 1.9 - 3.5 g/dL    A-G Ratio 1.3 g/dL   LIPASE   Result Value Ref Range    Lipase 23 11 - 82 U/L   HCG QUAL SERUM   Result Value Ref Range    Beta-Hcg Qualitative Serum Negative Negative   URINALYSIS,CULTURE IF INDICATED    Specimen: Urine   Result Value Ref Range    Color Yellow     Character Hazy (A)     Specific Gravity 1.033 <1.035    Ph 5.5 5.0 - 8.0    Glucose Negative Negative mg/dL    Ketones Trace (A) Negative mg/dL    Protein Negative Negative mg/dL    Bilirubin Negative Negative    Urobilinogen, Urine 0.2 Negative    Nitrite Negative Negative    Leukocyte Esterase  Moderate (A) Negative    Occult Blood Small (A) Negative    Micro Urine Req Microscopic    ESTIMATED GFR   Result Value Ref Range    GFR (CKD-EPI) 138 >60 mL/min/1.73 m 2   CORRECTED CALCIUM   Result Value Ref Range    Correct Calcium 8.7 8.5 - 10.5 mg/dL   URINE MICROSCOPIC (W/UA)   Result Value Ref Range    WBC 10-20 (A) /hpf    RBC 2-5 (A) /hpf    Bacteria Few (A) None /hpf    Epithelial Cells Few /hpf    Hyaline Cast 0-2 /lpf   URINE CULTURE(NEW)    Specimen: Urine   Result Value Ref Range    Significant Indicator NEG     Source UR     Site -     Culture Result -    Lactic Acid   Result Value Ref Range    Lactic Acid 1.2 0.5 - 2.0 mmol/L   CoV-2, Flu A/B, And RSV by PCR ([place])    Specimen: Nasal; Respirate   Result Value Ref Range    Influenza virus A RNA Negative Negative    Influenza virus B, PCR Negative Negative    RSV, PCR Negative Negative    SARS-CoV-2 by PCR NotDetected     SARS-CoV-2 Source NP Swab    CSF CULTURE    Specimen: Tap; CSF   Result Value Ref Range    Significant Indicator NEG     Source CSF     Site TAP     Culture Result -     Gram Stain Result No organisms seen.    CSF PROTEIN   Result Value Ref Range    Total Protein, CSF 19 15 - 45 mg/dL   CSF GLUCOSE   Result Value Ref Range    Glucose CSF 61 40 - 80 mg/dL   CSF CELL COUNT   Result Value Ref Range    Number Of Tubes 4     Volume 6.0 mL    Color-Body Fluid Colorless     Character-Body Fluid Clear     Supernatant Appearance Colorless     Total RBC Count <1 cells/uL    Crenated RBC 0 %    CSF Total Nucleated Cells 1 0 - 10 cells/uL    Comments See Comment     CSF Tube Number Tube 3    GRAM STAIN    Specimen: CSF   Result Value Ref Range    Significant Indicator .     Source CSF     Site TAP     Gram Stain Result No organisms seen.    EKG   Result Value Ref Range    Report       [facility] Emergency Dept.    Test Date:  2023-07-08  Pt Name:    AUTUMN RUBY                 Department: ER  MRN:        [id number]                       Room:       [facility]  Gender:     Female                       Technician: [id number]  :        2000                   Requested By:EVELYN DIMAS  Order #:    [id number]                    Reading MD: EVELYN DIMAS DO    Measurements  Intervals                                Axis  Rate:       107                          P:          57  TX:         155                          QRS:        57  QRSD:       88                           T:          34  QT:         322  QTc:        430    Interpretive Statements  Sinus tachycardia  No previous ECG available for comparison  Electronically Signed On 2023 03:51:35 PDT by EVELYN DIMAS DO       Lumbar Puncture Procedure    Indication: Suspected meningitis, worst headache of patient's life, and to obtain spinal fluid for diagnostic testing    Consent: The patient was counseled regarding the procedure, it's indications, risks, potential complications and alternatives and any questions were answered. Consent was obtained.    Procedure: The patient was placed in the sitting, supported by bed side stand, position and the appropriate landmarks were identified. The area was prepped and draped in the usual sterile fashion. Anesthesia was obtained using 4 cc of 1% Lidocaine without epinephrine. A spinal needle was inserted at the L4- L5 level with the stylet in place until spinal fluid was returned. Opening pressure was not measured. At this point 4.0 cc of clear cerebral spinal fluid was obtained and sent for appropriate testing. The stylet was then replaced and the needle was withdrawn. A sterile dressing was placed over the site and the patient was placed in the supine position.    The patient tolerated the procedure well.    Complications: None      RADIOLOGY  I have independently interpreted the diagnostic imaging associated with this visit and am waiting the final reading from the radiologist.   My preliminary interpretation is as follows: NAPD on  CXR    Radiologist interpretation:   CT-ABDOMEN-PELVIS WITH   Final Result         1. Mild wall thickening throughout the entire colon could relate to mild colitis.      2. IUD appears to be malpositioned with the arms in the anterior and posterior wall.      3. No bowel obstruction.      DX-CHEST-PORTABLE (1 VIEW)   Final Result         1. No acute cardiopulmonary abnormalities are identified.      CT-CTA HEAD WITH & W/O-POST PROCESS   Final Result      1.  No large vessel occlusion, high-grade stenosis or aneurysm of the [place].   2.  No CT evidence of acute infarct, hemorrhage or mass.   3.  Mild to moderate paranasal sinus disease.          COURSE & MEDICAL DECISION MAKING    ED Observation Status? Yes; I am placing the patient in to an observation status due to a diagnostic uncertainty as well as therapeutic intensity. Patient placed in observation status at 5:57 AM, 7/8/2023.     Observation plan is as follows: Due to diagnostic uncertainty, possible need for further advanced imaging, pending serial abdominal exams, reassessment after IV fluid resuscitation, patient's placed in ED observation.       Upon Reevaluation, the patient's condition has: not improved; and will be escalated to hospitalization.    Patient discharged from ED Observation status at 1420PM (Time) January 8, 2023 (Date).     INITIAL ASSESSMENT, COURSE AND PLAN  Care Narrative:     This is a pleasant, previously healthy 22-year-old female who presents with a significant tachycardia in the 120s on my evaluation.  She reports nausea vomiting and diarrhea for 2 days.  She then developed a headache.  She is afebrile though she reports an elevated temperature of 100 at home.  Labs were ordered in triage.  She was given orally dissolving Zofran.  She will be treated with pain medication and IV fluid resuscitation.    8:29 AM patient's reevaluated the bedside.  Unfortunately, only about 300 cc of her IV fluid bolus have infused.  She is  not feeling significantly improved.  She denies urinary tract symptoms although labs suggest this.  She is complaining of right-sided lower abdominal pain and which she describes as a sensation like the IUD is pulling.  She remains tachycardic.    9:55 AM patient's reevaluated the bedside.  She had 2 L of IV fluids then, her heart rate is improved but still elevated 105.  She reports she is not having any abdominal pain at that time but despite fentanyl and Toradol, she only has had minimal improvement in her headache.  Given this, I have suggested further imaging to which she is agreeable.    1243PM patient again reevaluated the bedside.  She still complaining of a headache.  CT imaging was unrevealing.  She is persistently tachycardic after 2 L of IV fluids.  She again denies any abdominal pain.  Not have meningismal signs.  She denies vaginal discharge or odor or itching.  She has been afebrile here but did report a temperature of 100 at home.  Have added blood cultures, lactate as well as a chest x-ray for screening purposes and a COVID test.  It is unclear, the source of her headache or her tachycardia.  She is not short of breath or hypoxic.  She is not tachypneic.  Doubt pulmonary embolism.  At this point, we will plan for admission to the hospital for further care.    1318PM discussed with the hospitalist regarding patient's presenting symptoms, tachycardia.  We both agree, further evaluation with lumbar puncture is warranted and he agrees to admit the patient to his service.  I have discussed the risks and benefits of this procedure with the patient.  She is familiar with it based on her recent pregnancy and having an epidural for labor and delivery.  She was given an opportunity for questions.  She is agreeable.    Patient Tolerated lumbar puncture without difficulty.  She is laid flat after procedure.  She understands plan of care for hospital admission for ongoing evaluation.    HYDRATION: Based on the  patient's presentation of Tachycardia the patient was given IV fluids. IV Hydration was used because oral hydration was not adequate alone. Upon recheck following hydration, the patient was Improved.        DISPOSITION AND DISCUSSIONS  I have discussed management of the patient with the following physicians and STEPHANIE's: Hospitalist    Discussion of management with other Q or appropriate source(s): None       FINAL DIAGNOSIS  1. Acute intractable headache, unspecified headache type    2. Tachycardia           Electronically signed by: Susana Lozoya D.O., 7/8/2023 6:54 AM

## 2023-07-08 NOTE — PROGRESS NOTES
4 Eyes Skin Assessment Completed by Gemma, RN and MORIS Helms.    Head WDL  Ears WDL  Nose WDL  Mouth WDL  Neck WDL  Breast/Chest WDL  Shoulder Blades WDL  Spine Incision, LP in ED  (R) Arm/Elbow/Hand WDL  (L) Arm/Elbow/Hand WDL  Abdomen WDL  Groin WDL  Scrotum/Coccyx/Buttocks WDL  (R) Leg WDL  (L) Leg WDL  (R) Heel/Foot/Toe WDL  (L) Heel/Foot/Toe WDL          Devices In Places Tele Box and Pulse Ox      Interventions In Place Pillows    Possible Skin Injury No    Pictures Uploaded Into Epic N/A  Wound Consult Placed N/A  RN Wound Prevention Protocol Ordered Yes

## 2023-07-08 NOTE — H&P
Hospital Medicine History & Physical Note    Date of Service  7/8/2023    Primary Care Physician  CECILY Pope    Consultants  None    Code Status  Full Code    Chief Complaint  Chief Complaint   Patient presents with    Abdominal Pain     C/o lower abdominal pain x 2 days. Described as sharp pains.    Nausea/Vomiting/Diarrhea     Started yesterday. Described diarrhea as watery.    Headache     Since yesterday       History of Presenting Illness  Porfirio Keenan is a 22 y.o. female who presented 7/8/2023 with abdominal pain started 2 days ago.  Patient reports lower abdominal pain associated with nausea, vomiting and diarrhea.  She also reported a headache that started yesterday.  Patient denies any fevers, chills or neck stiffness.  She denies any dysuria but does report some frequency.  In the ER she was noted to be tachycardic with a heart rate in the 130s.  Initial blood work revealed leukocytosis of 15, sodium 133, potassium 3.4, lactic acid 1.2.  UA was mildly positive for infection with WBCs 10-20 and moderate leukocyte esterase and few bacteria.  Patient underwent a lumbar puncture that was negative for infection.  CTA head with and without IV contrast was negative for acute intracranial process or large vessel occlusion.  Patient continues to report some lower abdominal pain.  I will order a stat CT abdomen pelvis with IV contrast.  She continues to remain tachycardic despite receiving 2 L of fluids.  She will be placed in the clinical decision unit with ongoing management and close monitoring    I discussed the plan of care with patient, family, bedside RN, and ERP .    Review of Systems  Review of Systems   Constitutional:  Positive for malaise/fatigue.   Gastrointestinal:  Positive for abdominal pain, diarrhea, nausea and vomiting.   Genitourinary:  Positive for frequency.   All other systems reviewed and are negative.      Past Medical History   has a past medical history of Anemia, Breath shortness,  and Cold.    Surgical History  No pertinent surgical hx      Family History  family history is not on file.   Family history reviewed with patient. There is no family history that is pertinent to the chief complaint.     Social History   reports that she has never smoked. She has never used smokeless tobacco. She reports that she does not drink alcohol and does not use drugs.    Allergies  No Known Allergies    Medications  Prior to Admission Medications   Prescriptions Last Dose Informant Patient Reported? Taking?   ibuprofen (MOTRIN) 800 MG Tab 2023 at 1100 Patient Yes Yes   Sig: Take 800 mg by mouth 1 time a day as needed for Mild Pain.   levonorgestrel (MIRENA, 52 MG,) 20 MCG/DAY IUD 2023 placed at unk Patient Yes Yes   Si Each by Intrauterine route one time. Placed 2023 replaced every 5 years      Facility-Administered Medications: None       Physical Exam  Temp:  [36.8 °C (98.2 °F)-37.2 °C (99 °F)] 37.2 °C (99 °F)  Pulse:  [] 117  Resp:  [16-24] 16  BP: ()/(50-70) 108/57  SpO2:  [90 %-98 %] 92 %  Blood Pressure: 108/57   Temperature: 37.2 °C (99 °F)   Pulse: (!) 117   Respiration: 16   Pulse Oximetry: 92 %       Physical Exam  Vitals and nursing note reviewed.   Constitutional:       Appearance: Normal appearance. She is ill-appearing.   HENT:      Head: Normocephalic and atraumatic.      Nose: Nose normal.      Mouth/Throat:      Mouth: Mucous membranes are moist.   Eyes:      Extraocular Movements: Extraocular movements intact.      Conjunctiva/sclera: Conjunctivae normal.      Pupils: Pupils are equal, round, and reactive to light.   Cardiovascular:      Rate and Rhythm: Regular rhythm. Tachycardia present.      Pulses: Normal pulses.      Heart sounds: Normal heart sounds.   Pulmonary:      Effort: Pulmonary effort is normal. No respiratory distress.      Breath sounds: Normal breath sounds. No wheezing, rhonchi or rales.   Abdominal:      General: Bowel sounds are normal. There is  no distension.      Palpations: Abdomen is soft.      Tenderness: There is abdominal tenderness (lower abd).   Musculoskeletal:         General: No swelling or tenderness. Normal range of motion.      Cervical back: Normal range of motion and neck supple.   Lymphadenopathy:      Cervical: No cervical adenopathy.   Skin:     General: Skin is warm.      Coloration: Skin is not jaundiced.      Findings: No rash.   Neurological:      General: No focal deficit present.      Mental Status: She is alert and oriented to person, place, and time.      Cranial Nerves: No cranial nerve deficit.      Motor: No weakness.   Psychiatric:         Mood and Affect: Mood normal.         Behavior: Behavior normal.         Laboratory:  Recent Labs     07/08/23 0458   WBC 15.0*   RBC 5.19   HEMOGLOBIN 13.7   HEMATOCRIT 41.5   MCV 80.0*   MCH 26.4*   MCHC 33.0   RDW 40.3   PLATELETCT 245   MPV 10.6     Recent Labs     07/08/23 0458   SODIUM 133*   POTASSIUM 3.4*   CHLORIDE 99   CO2 21   GLUCOSE 120*   BUN 11   CREATININE 0.47*   CALCIUM 9.0     Recent Labs     07/08/23 0458   ALTSGPT 18   ASTSGOT 15   ALKPHOSPHAT 147*   TBILIRUBIN 0.2   LIPASE 23   GLUCOSE 120*         No results for input(s): NTPROBNP in the last 72 hours.      No results for input(s): TROPONINT in the last 72 hours.    Imaging:  DX-CHEST-PORTABLE (1 VIEW)   Final Result         1. No acute cardiopulmonary abnormalities are identified.      CT-CTA HEAD WITH & W/O-POST PROCESS   Final Result      1.  No large vessel occlusion, high-grade stenosis or aneurysm of the [place].   2.  No CT evidence of acute infarct, hemorrhage or mass.   3.  Mild to moderate paranasal sinus disease.        EKG interpreted by me reveals sinus tachycardia with a rate of 107 with no acute ischemic changes      Assessment/Plan:  Justification for Admission Status  I anticipate this patient is appropriate for observation status at this time because      Patient will need a Telemetry bed  on MEDICAL service .  The need is secondary to .    * SIRS (systemic inflammatory response syndrome) (HCC)- (present on admission)  Assessment & Plan  With tachycardia and leukocytosis  IV fluid hydration with NS  Check blood and stool culture  LP performed, negative for meningitis  Influenza and COVID-negative  Continuous cardiac monitoring for persistent tachycardia      UTI (urinary tract infection)  Assessment & Plan  Started on IV ceftriaxone  Follow urine culture    Headache  Assessment & Plan  Likely related to viral illness versus migraine  Patient will be given migraine cocktail with IV Toradol, IV Reglan, IV magnesium  LP performed, negative for meningitis  CT head negative for acute intracranial malady  Tylenol as needed    Abdominal pain- (present on admission)  Assessment & Plan  Likely secondary to viral gastroenteritis  Started on IV fluid hydration  Supportive care  Pain control with Toradol  IV Zofran as needed  Check CT abdomen pelvis with IV contrast for further evaluation          VTE prophylaxis: enoxaparin ppx    I independently reviewed pertinent clinical lab tests since admission and ordered additional follow up clinical lab tests.  I independently reviewed pertinent radiographic images and the radiologist's reports since admission and ordered additional follow up radiographic studies.  The details of the available patient records in [facility] (including laboratory tests, culture data, medications, imaging, and other pertinent diagnostic tests) and that information was utilized as warranted in today's medical decision making for this patient.  I personally evaluated the patient condition at bedside.     Care interventions include:   Review of interval medical and surgical history, current medications and outpatient medication reconciliation, interval imaging studies and radiologist interpretation, and interval laboratory values.     Aggregated care time spent evaluating, reassessing, reviewing  documentation, providing care, counseling, coordinating care and managing this patient exclusive of procedures: 72 minutes

## 2023-07-08 NOTE — ED TRIAGE NOTES
Porfirio Keenan  22 y.o.  female  Chief Complaint   Patient presents with    Abdominal Pain     C/o lower abdominal pain x 2 days. Described as sharp pains.    Nausea/Vomiting/Diarrhea     Started yesterday. Described diarrhea as watery.    Headache     Since yesterday

## 2023-07-09 VITALS
RESPIRATION RATE: 16 BRPM | HEART RATE: 83 BPM | DIASTOLIC BLOOD PRESSURE: 71 MMHG | WEIGHT: 177.91 LBS | SYSTOLIC BLOOD PRESSURE: 113 MMHG | HEIGHT: 67 IN | BODY MASS INDEX: 27.92 KG/M2 | OXYGEN SATURATION: 96 % | TEMPERATURE: 97.3 F

## 2023-07-09 PROBLEM — R10.9 ABDOMINAL PAIN: Status: RESOLVED | Noted: 2023-07-08 | Resolved: 2023-07-09

## 2023-07-09 LAB
ANION GAP SERPL CALC-SCNC: 11 MMOL/L (ref 7–16)
BUN SERPL-MCNC: 4 MG/DL (ref 8–22)
CALCIUM SERPL-MCNC: 8.1 MG/DL (ref 8.5–10.5)
CHLORIDE SERPL-SCNC: 106 MMOL/L (ref 96–112)
CO2 SERPL-SCNC: 23 MMOL/L (ref 20–33)
CREAT SERPL-MCNC: 0.54 MG/DL (ref 0.5–1.4)
E COLI SXT1+2 STL IA: NORMAL
EKG IMPRESSION: NORMAL
ERYTHROCYTE [DISTWIDTH] IN BLOOD BY AUTOMATED COUNT: 41.2 FL (ref 35.9–50)
GFR SERPLBLD CREATININE-BSD FMLA CKD-EPI: 133 ML/MIN/1.73 M 2
GLUCOSE SERPL-MCNC: 114 MG/DL (ref 65–99)
HCT VFR BLD AUTO: 36.5 % (ref 37–47)
HGB BLD-MCNC: 11.8 G/DL (ref 12–16)
MCH RBC QN AUTO: 25.8 PG (ref 27–33)
MCHC RBC AUTO-ENTMCNC: 32.3 G/DL (ref 32.2–35.5)
MCV RBC AUTO: 79.9 FL (ref 81.4–97.8)
PLATELET # BLD AUTO: 210 K/UL (ref 164–446)
PMV BLD AUTO: 10.4 FL (ref 9–12.9)
POTASSIUM SERPL-SCNC: 3.3 MMOL/L (ref 3.6–5.5)
RBC # BLD AUTO: 4.57 M/UL (ref 4.2–5.4)
SIGNIFICANT IND 70042: NORMAL
SITE SITE: NORMAL
SODIUM SERPL-SCNC: 140 MMOL/L (ref 135–145)
SOURCE SOURCE: NORMAL
WBC # BLD AUTO: 9 K/UL (ref 4.8–10.8)

## 2023-07-09 PROCEDURE — 99239 HOSP IP/OBS DSCHRG MGMT >30: CPT | Performed by: INTERNAL MEDICINE

## 2023-07-09 PROCEDURE — 80048 BASIC METABOLIC PNL TOTAL CA: CPT

## 2023-07-09 PROCEDURE — 700111 HCHG RX REV CODE 636 W/ 250 OVERRIDE (IP): Mod: UD | Performed by: INTERNAL MEDICINE

## 2023-07-09 PROCEDURE — 96376 TX/PRO/DX INJ SAME DRUG ADON: CPT

## 2023-07-09 PROCEDURE — 85027 COMPLETE CBC AUTOMATED: CPT

## 2023-07-09 PROCEDURE — G0378 HOSPITAL OBSERVATION PER HR: HCPCS

## 2023-07-09 PROCEDURE — 700105 HCHG RX REV CODE 258: Mod: UD | Performed by: INTERNAL MEDICINE

## 2023-07-09 RX ORDER — SODIUM CHLORIDE 9 MG/ML
1000 INJECTION, SOLUTION INTRAVENOUS ONCE
Status: COMPLETED | OUTPATIENT
Start: 2023-07-09 | End: 2023-07-09

## 2023-07-09 RX ADMIN — SODIUM CHLORIDE 1000 ML: 9 INJECTION, SOLUTION INTRAVENOUS at 08:45

## 2023-07-09 RX ADMIN — KETOROLAC TROMETHAMINE 15 MG: 30 INJECTION, SOLUTION INTRAMUSCULAR; INTRAVENOUS at 11:33

## 2023-07-09 RX ADMIN — SODIUM CHLORIDE: 9 INJECTION, SOLUTION INTRAVENOUS at 03:47

## 2023-07-09 ASSESSMENT — PAIN DESCRIPTION - PAIN TYPE
TYPE: ACUTE PAIN
TYPE: ACUTE PAIN

## 2023-07-09 NOTE — DISCHARGE SUMMARY
Discharge Summary    CHIEF COMPLAINT ON ADMISSION  Chief Complaint   Patient presents with    Abdominal Pain     C/o lower abdominal pain x 2 days. Described as sharp pains.    Nausea/Vomiting/Diarrhea     Started yesterday. Described diarrhea as watery.    Headache     Since yesterday       Reason for Admission  Headache; N/V; Abdominal Pain      Admission Date  7/8/2023    CODE STATUS  Full Code    HPI & HOSPITAL COURSE  Porfirio Keenan is a 22 y.o. female who presented 7/8/2023 with abdominal pain associated with nausea, vomiting and diarrhea.  She also reported a headache that started yesterday.  Reported a fever of 100 at home.  She denies any dysuria but does report some frequency.  In the ER she was noted to be tachycardic with a heart rate in the 130s.  Initial blood work revealed leukocytosis of 15, sodium 133, potassium 3.4, lactic acid 1.2.  UA was mildly positive for infection with WBCs 10-20 and moderate leukocyte esterase and few bacteria.  Patient underwent a lumbar puncture that was negative for infection.  CTA head with and without IV contrast was negative for acute intracranial process or large vessel occlusion.  CT abdomen pelvis with IV contrast revealed mild wall thickening throughout the entire colon could relate to mild colitis.  IUD appears to be malpositioned with the arms in the anterior and posterior wall.  No bowel obstruction.  Patient was treated with IV fluid hydration and received 3 L of NS with improvement of her heart rate.  She was also treated with IV ceftriaxone for UTI.  The next day her symptoms improved and she was able to tolerate a diet.  Her heart rate was in the 80s to 90s.  She was able to ambulate without difficulty.  She was instructed to follow-up with her PCP and to return to the ER for worsening symptoms    Therefore, she is discharged in good and stable condition to home with close outpatient follow-up.    The patient recovered much more quickly than anticipated on  admission.    Discharge Date  7/9/23    FOLLOW UP ITEMS POST DISCHARGE  Follow up with pcp    DISCHARGE DIAGNOSES  Principal Problem:    SIRS (systemic inflammatory response syndrome) (HCC) (POA: Yes)  Active Problems:    Headache (POA: Unknown)    UTI (urinary tract infection) (POA: Unknown)  Resolved Problems:    Abdominal pain (POA: Yes)      FOLLOW UP  No future appointments.  No follow-up provider specified.    MEDICATIONS ON DISCHARGE     Medication List        CONTINUE taking these medications        Instructions   ibuprofen 800 MG Tabs  Commonly known as: Motrin   Take 800 mg by mouth 1 time a day as needed for Mild Pain.  Dose: 800 mg     Mirena (52 MG) 20 MCG/DAY IUD  Generic drug: levonorgestrel   1 Each by Intrauterine route one time. Placed 2/2023 replaced every 5 years  Dose: 1 Each              Allergies  No Known Allergies    DIET  Orders Placed This Encounter   Procedures    Diet Order Diet: Regular     Standing Status:   Standing     Number of Occurrences:   1     Order Specific Question:   Diet:     Answer:   Regular [1]       ACTIVITY  As tolerated.  Weight bearing as tolerated    CONSULTATIONS  none    PROCEDURES  none    LABORATORY  Lab Results   Component Value Date    SODIUM 140 07/09/2023    POTASSIUM 3.3 (L) 07/09/2023    CHLORIDE 106 07/09/2023    CO2 23 07/09/2023    GLUCOSE 114 (H) 07/09/2023    BUN 4 (L) 07/09/2023    CREATININE 0.54 07/09/2023        Lab Results   Component Value Date    WBC 9.0 07/09/2023    HEMOGLOBIN 11.8 (L) 07/09/2023    HEMATOCRIT 36.5 (L) 07/09/2023    PLATELETCT 210 07/09/2023        Total time of the discharge process exceeds 35 minutes.

## 2023-07-09 NOTE — PROGRESS NOTES
Assumed care at 0700. Sleeping most of the am. States is still breast pumping and producing 3-4 oz. Ortho vs were positive for HR increase. IV fluid bolus given.

## 2023-07-09 NOTE — CARE PLAN
Problem: Pain - Standard  Goal: Alleviation of pain or a reduction in pain to the patient’s comfort goal  Outcome: Progressing     Problem: Knowledge Deficit - Standard  Goal: Patient and family/care givers will demonstrate understanding of plan of care, disease process/condition, diagnostic tests and medications  Outcome: Progressing   The patient is Stable - Low risk of patient condition declining or worsening    Shift Goals  Clinical Goals: pain control, CT Abdomen/Pelvis, stool, IV fluids, ABTX  Patient Goals: Pain control    Progress made toward(s) clinical / shift goals:  Patient is able to voice understanding of her plan of care.     Patient is not progressing towards the following goals: N/A

## 2023-07-09 NOTE — DISCHARGE INSTRUCTIONS
Diet    Resume your normal diet as tolerated.  A diet low in cholesterol, fat, and sodium is recommended for good health.     Drink plenty of fluids.

## 2023-07-10 LAB
BACTERIA STL CULT: NORMAL
BACTERIA UR CULT: NORMAL
C JEJUNI+C COLI AG STL QL: NORMAL
E COLI SXT1+2 STL IA: NORMAL
SIGNIFICANT IND 70042: NORMAL
SIGNIFICANT IND 70042: NORMAL
SITE SITE: NORMAL
SITE SITE: NORMAL
SOURCE SOURCE: NORMAL
SOURCE SOURCE: NORMAL

## 2023-07-11 LAB
BACTERIA CSF CULT: NORMAL
GRAM STN SPEC: NORMAL
SIGNIFICANT IND 70042: NORMAL
SITE SITE: NORMAL
SOURCE SOURCE: NORMAL

## 2023-07-13 LAB
BACTERIA BLD CULT: NORMAL
BACTERIA BLD CULT: NORMAL
SIGNIFICANT IND 70042: NORMAL
SIGNIFICANT IND 70042: NORMAL
SITE SITE: NORMAL
SITE SITE: NORMAL
SOURCE SOURCE: NORMAL
SOURCE SOURCE: NORMAL

## 2023-12-04 ENCOUNTER — APPOINTMENT (OUTPATIENT)
Dept: RADIOLOGY | Facility: MEDICAL CENTER | Age: 23
End: 2023-12-04
Attending: EMERGENCY MEDICINE
Payer: COMMERCIAL

## 2023-12-04 ENCOUNTER — HOSPITAL ENCOUNTER (EMERGENCY)
Facility: MEDICAL CENTER | Age: 23
End: 2023-12-04
Attending: EMERGENCY MEDICINE
Payer: COMMERCIAL

## 2023-12-04 VITALS
TEMPERATURE: 97.6 F | SYSTOLIC BLOOD PRESSURE: 115 MMHG | DIASTOLIC BLOOD PRESSURE: 60 MMHG | HEART RATE: 84 BPM | RESPIRATION RATE: 16 BRPM | BODY MASS INDEX: 30.59 KG/M2 | HEIGHT: 67 IN | WEIGHT: 194.89 LBS | OXYGEN SATURATION: 98 %

## 2023-12-04 DIAGNOSIS — K59.00 CONSTIPATION, UNSPECIFIED CONSTIPATION TYPE: ICD-10-CM

## 2023-12-04 DIAGNOSIS — N83.209 HEMORRHAGIC OVARIAN CYST: ICD-10-CM

## 2023-12-04 DIAGNOSIS — R10.2 PELVIC PAIN: ICD-10-CM

## 2023-12-04 LAB
ALBUMIN SERPL BCP-MCNC: 4.1 G/DL (ref 3.2–4.9)
ALBUMIN/GLOB SERPL: 1.2 G/DL
ALP SERPL-CCNC: 126 U/L (ref 30–99)
ALT SERPL-CCNC: 21 U/L (ref 2–50)
ANION GAP SERPL CALC-SCNC: 10 MMOL/L (ref 7–16)
APPEARANCE UR: CLEAR
AST SERPL-CCNC: 19 U/L (ref 12–45)
BACTERIA #/AREA URNS HPF: NEGATIVE /HPF
BASOPHILS # BLD AUTO: 0.4 % (ref 0–1.8)
BASOPHILS # BLD: 0.05 K/UL (ref 0–0.12)
BILIRUB SERPL-MCNC: <0.2 MG/DL (ref 0.1–1.5)
BILIRUB UR QL STRIP.AUTO: NEGATIVE
BUN SERPL-MCNC: 11 MG/DL (ref 8–22)
CALCIUM ALBUM COR SERPL-MCNC: 9.2 MG/DL (ref 8.5–10.5)
CALCIUM SERPL-MCNC: 9.3 MG/DL (ref 8.5–10.5)
CHLORIDE SERPL-SCNC: 104 MMOL/L (ref 96–112)
CO2 SERPL-SCNC: 23 MMOL/L (ref 20–33)
COLOR UR: YELLOW
CREAT SERPL-MCNC: 0.54 MG/DL (ref 0.5–1.4)
EOSINOPHIL # BLD AUTO: 0.19 K/UL (ref 0–0.51)
EOSINOPHIL NFR BLD: 1.6 % (ref 0–6.9)
EPI CELLS #/AREA URNS HPF: NORMAL /HPF
ERYTHROCYTE [DISTWIDTH] IN BLOOD BY AUTOMATED COUNT: 39.3 FL (ref 35.9–50)
GFR SERPLBLD CREATININE-BSD FMLA CKD-EPI: 133 ML/MIN/1.73 M 2
GLOBULIN SER CALC-MCNC: 3.4 G/DL (ref 1.9–3.5)
GLUCOSE SERPL-MCNC: 94 MG/DL (ref 65–99)
GLUCOSE UR STRIP.AUTO-MCNC: NEGATIVE MG/DL
HCG SERPL QL: NEGATIVE
HCT VFR BLD AUTO: 42.9 % (ref 37–47)
HGB BLD-MCNC: 13.7 G/DL (ref 12–16)
HYALINE CASTS #/AREA URNS LPF: NORMAL /LPF
IMM GRANULOCYTES # BLD AUTO: 0.05 K/UL (ref 0–0.11)
IMM GRANULOCYTES NFR BLD AUTO: 0.4 % (ref 0–0.9)
KETONES UR STRIP.AUTO-MCNC: ABNORMAL MG/DL
LEUKOCYTE ESTERASE UR QL STRIP.AUTO: ABNORMAL
LIPASE SERPL-CCNC: 31 U/L (ref 11–82)
LYMPHOCYTES # BLD AUTO: 3.92 K/UL (ref 1–4.8)
LYMPHOCYTES NFR BLD: 33.5 % (ref 22–41)
MCH RBC QN AUTO: 26.1 PG (ref 27–33)
MCHC RBC AUTO-ENTMCNC: 31.9 G/DL (ref 32.2–35.5)
MCV RBC AUTO: 81.7 FL (ref 81.4–97.8)
MICRO URNS: ABNORMAL
MONOCYTES # BLD AUTO: 0.86 K/UL (ref 0–0.85)
MONOCYTES NFR BLD AUTO: 7.4 % (ref 0–13.4)
NEUTROPHILS # BLD AUTO: 6.62 K/UL (ref 1.82–7.42)
NEUTROPHILS NFR BLD: 56.7 % (ref 44–72)
NITRITE UR QL STRIP.AUTO: NEGATIVE
NRBC # BLD AUTO: 0 K/UL
NRBC BLD-RTO: 0 /100 WBC (ref 0–0.2)
PH UR STRIP.AUTO: 7 [PH] (ref 5–8)
PLATELET # BLD AUTO: 350 K/UL (ref 164–446)
PMV BLD AUTO: 10.8 FL (ref 9–12.9)
POTASSIUM SERPL-SCNC: 3.8 MMOL/L (ref 3.6–5.5)
PROT SERPL-MCNC: 7.5 G/DL (ref 6–8.2)
PROT UR QL STRIP: NEGATIVE MG/DL
RBC # BLD AUTO: 5.25 M/UL (ref 4.2–5.4)
RBC # URNS HPF: NORMAL /HPF
RBC UR QL AUTO: NEGATIVE
SODIUM SERPL-SCNC: 137 MMOL/L (ref 135–145)
SP GR UR STRIP.AUTO: 1.03
UROBILINOGEN UR STRIP.AUTO-MCNC: 0.2 MG/DL
WBC # BLD AUTO: 11.7 K/UL (ref 4.8–10.8)
WBC #/AREA URNS HPF: NORMAL /HPF

## 2023-12-04 PROCEDURE — 81001 URINALYSIS AUTO W/SCOPE: CPT

## 2023-12-04 PROCEDURE — 76830 TRANSVAGINAL US NON-OB: CPT

## 2023-12-04 PROCEDURE — A9270 NON-COVERED ITEM OR SERVICE: HCPCS | Mod: UD | Performed by: EMERGENCY MEDICINE

## 2023-12-04 PROCEDURE — 74018 RADEX ABDOMEN 1 VIEW: CPT

## 2023-12-04 PROCEDURE — 84703 CHORIONIC GONADOTROPIN ASSAY: CPT

## 2023-12-04 PROCEDURE — 36415 COLL VENOUS BLD VENIPUNCTURE: CPT

## 2023-12-04 PROCEDURE — 80053 COMPREHEN METABOLIC PANEL: CPT

## 2023-12-04 PROCEDURE — 83690 ASSAY OF LIPASE: CPT

## 2023-12-04 PROCEDURE — 99284 EMERGENCY DEPT VISIT MOD MDM: CPT

## 2023-12-04 PROCEDURE — 85025 COMPLETE CBC W/AUTO DIFF WBC: CPT

## 2023-12-04 PROCEDURE — 700102 HCHG RX REV CODE 250 W/ 637 OVERRIDE(OP): Mod: UD | Performed by: EMERGENCY MEDICINE

## 2023-12-04 RX ORDER — BISACODYL 5 MG
10 TABLET, DELAYED RELEASE (ENTERIC COATED) ORAL ONCE
Status: COMPLETED | OUTPATIENT
Start: 2023-12-04 | End: 2023-12-04

## 2023-12-04 RX ORDER — TRAMADOL HYDROCHLORIDE 50 MG/1
50 TABLET ORAL EVERY 6 HOURS PRN
Qty: 15 TABLET | Refills: 0 | Status: SHIPPED | OUTPATIENT
Start: 2023-12-04 | End: 2023-12-09

## 2023-12-04 RX ADMIN — BISACODYL 10 MG: 5 TABLET, COATED ORAL at 20:08

## 2023-12-04 ASSESSMENT — FIBROSIS 4 INDEX: FIB4 SCORE: 0.37

## 2023-12-05 NOTE — ED PROVIDER NOTES
"ED Provider Note    CHIEF COMPLAINT  Chief Complaint   Patient presents with    Pelvic Pain     LEFT-sided  Worsening since Saturday  Exacerbated with movement  Describes pain as 7/10 stabbing/dull       EXTERNAL RECORDS REVIEWED  Patient was admitted into the hospital on 7/8/2023 for systemic inflammatory response syndrome.    HPI/ROS  LIMITATION TO HISTORY   Select: : None  OUTSIDE HISTORIAN(S):  None    Porfirio Keenan is a 23 y.o. female who presents tonight with a chief complaint of left lower quadrant abdominal pain that has been present since Saturday.  It has increased in severity.  She also describes some suprapubic and right lower quadrant pain.  The pain worsens with movement and she describes it as sharp and stabbing in nature.  She denies any fever, chills, nausea, vomiting.  She has had no dysuria or hematuria.    PAST MEDICAL HISTORY   has a past medical history of Anemia, Breath shortness, Cold, and Colitis.    SURGICAL HISTORY  patient denies any surgical history    FAMILY HISTORY  History reviewed. No pertinent family history.    SOCIAL HISTORY  Social History     Tobacco Use    Smoking status: Never    Smokeless tobacco: Never   Vaping Use    Vaping Use: Never used   Substance and Sexual Activity    Alcohol use: Yes     Comment: occasional    Drug use: No    Sexual activity: Not on file       CURRENT MEDICATIONS  Home Medications       Reviewed by Tessy Vee R.N. (Registered Nurse) on 12/04/23 at 1800  Med List Status: Partial     Medication Last Dose Status   ibuprofen (MOTRIN) 800 MG Tab  Active   levonorgestrel (MIRENA, 52 MG,) 20 MCG/DAY IUD  Active                    ALLERGIES  No Known Allergies    PHYSICAL EXAM  VITAL SIGNS: /60   Pulse 84   Temp 36.4 °C (97.6 °F) (Temporal)   Resp 16   Ht 1.702 m (5' 7\")   Wt 88.4 kg (194 lb 14.2 oz)   SpO2 98%   BMI 30.52 kg/m²    Constitutional: Patient is well developed, well nourished. Non-toxic appearing.  Mild distress.  HENT: " Normocephalic, Nose normal with no mucosal edema or drainage. Oropharynx moist without erythema or exudates.  Eyes: PERRL, EOMI, Conjunctiva without erythema  Neck: Supple   Lymphatic: No lymphadenopathy noted.   Cardiovascular: Normal heart rate and Regular rhythm. No murmur  Thorax & Lungs: Clear and equal breath sounds with good excursion. No respiratory distress, no rhonchi, wheezing or rales.  Abdomen: Bowel sounds normal in all four quadrants. Soft, moderate left lower quadrant abdominal pain with mild right lower quadrant and suprapubic pain.  There is no rebound, guarding or flank tenderness.  Skin: Warm, Dry, No erythema, No rashes.   Extremities: Peripheral pulses 4/4   Musculoskeletal: Normal range of motion in all major joints. No tenderness to palpation or major deformities noted.   Neurologic: Alert & oriented x 3, Normal motor function, Normal sensory function  Psychiatric: Affect normal, Judgment normal, Mood normal.      DIAGNOSTIC STUDIES / PROCEDURES      LABS  Results for orders placed or performed during the hospital encounter of 12/04/23   CBC WITH DIFFERENTIAL   Result Value Ref Range    WBC 11.7 (H) 4.8 - 10.8 K/uL    RBC 5.25 4.20 - 5.40 M/uL    Hemoglobin 13.7 12.0 - 16.0 g/dL    Hematocrit 42.9 37.0 - 47.0 %    MCV 81.7 81.4 - 97.8 fL    MCH 26.1 (L) 27.0 - 33.0 pg    MCHC 31.9 (L) 32.2 - 35.5 g/dL    RDW 39.3 35.9 - 50.0 fL    Platelet Count 350 164 - 446 K/uL    MPV 10.8 9.0 - 12.9 fL    Neutrophils-Polys 56.70 44.00 - 72.00 %    Lymphocytes 33.50 22.00 - 41.00 %    Monocytes 7.40 0.00 - 13.40 %    Eosinophils 1.60 0.00 - 6.90 %    Basophils 0.40 0.00 - 1.80 %    Immature Granulocytes 0.40 0.00 - 0.90 %    Nucleated RBC 0.00 0.00 - 0.20 /100 WBC    Neutrophils (Absolute) 6.62 1.82 - 7.42 K/uL    Lymphs (Absolute) 3.92 1.00 - 4.80 K/uL    Monos (Absolute) 0.86 (H) 0.00 - 0.85 K/uL    Eos (Absolute) 0.19 0.00 - 0.51 K/uL    Baso (Absolute) 0.05 0.00 - 0.12 K/uL    Immature Granulocytes  (abs) 0.05 0.00 - 0.11 K/uL    NRBC (Absolute) 0.00 K/uL   COMP METABOLIC PANEL   Result Value Ref Range    Sodium 137 135 - 145 mmol/L    Potassium 3.8 3.6 - 5.5 mmol/L    Chloride 104 96 - 112 mmol/L    Co2 23 20 - 33 mmol/L    Anion Gap 10.0 7.0 - 16.0    Glucose 94 65 - 99 mg/dL    Bun 11 8 - 22 mg/dL    Creatinine 0.54 0.50 - 1.40 mg/dL    Calcium 9.3 8.5 - 10.5 mg/dL    Correct Calcium 9.2 8.5 - 10.5 mg/dL    AST(SGOT) 19 12 - 45 U/L    ALT(SGPT) 21 2 - 50 U/L    Alkaline Phosphatase 126 (H) 30 - 99 U/L    Total Bilirubin <0.2 0.1 - 1.5 mg/dL    Albumin 4.1 3.2 - 4.9 g/dL    Total Protein 7.5 6.0 - 8.2 g/dL    Globulin 3.4 1.9 - 3.5 g/dL    A-G Ratio 1.2 g/dL   LIPASE   Result Value Ref Range    Lipase 31 11 - 82 U/L   HCG QUAL SERUM   Result Value Ref Range    Beta-Hcg Qualitative Serum Negative Negative   URINALYSIS,CULTURE IF INDICATED    Specimen: Urine   Result Value Ref Range    Color Yellow     Character Clear     Specific Gravity 1.029 <1.035    Ph 7.0 5.0 - 8.0    Glucose Negative Negative mg/dL    Ketones Trace (A) Negative mg/dL    Protein Negative Negative mg/dL    Bilirubin Negative Negative    Urobilinogen, Urine 0.2 Negative    Nitrite Negative Negative    Leukocyte Esterase Small (A) Negative    Occult Blood Negative Negative    Micro Urine Req Microscopic    ESTIMATED GFR   Result Value Ref Range    GFR (CKD-EPI) 133 >60 mL/min/1.73 m 2   URINE MICROSCOPIC (W/UA)   Result Value Ref Range    WBC 0-2 /hpf    RBC 0-2 /hpf    Bacteria Negative None /hpf    Epithelial Cells Few /hpf    Hyaline Cast 0-2 /lpf        RADIOLOGY  I have independently interpreted the diagnostic imaging associated with this visit and am waiting the final reading from the radiologist.   My preliminary interpretation is as follows: Increased stool consistent with constipation  Radiologist interpretation:   US-PELVIC COMPLETE (TRANSABDOMINAL/TRANSVAGINAL) (COMBO)   Final Result         1.  Hypoechoic left ovarian lesion with  specular echoes and septation, appearance favoring hemorrhagic cyst or endometrioma, otherwise indeterminate. Recommend six-week follow-up pelvic sonogram for evaluation of size stability and further    characterization.      [id number] VIEW   Final Result      1.  Marked constipation.           COURSE & MEDICAL DECISION MAKING    ED Observation Status? No; Patient does not meet criteria for ED Observation.     INITIAL ASSESSMENT, COURSE AND PLAN  Care Narrative: Patient was evaluated and treated for her abdominal pain.  Her laboratories were unremarkable.  White count is slightly elevated 11.7, there is no left shift.  Urinalysis is unremarkable.  KUB shows increased stool consistent with constipation and ultrasound shows a left ovarian lesion consistent with a hemorrhagic cyst or endometrioma.  She had been given Dulcolax here in the ER and will be sent home with a prescription for tramadol to take as needed for severe pain otherwise she is to take ibuprofen.  She has a scheduled appointment with her gynecologist this Thursday and was given a copy of her ultrasound report to take with her to the appointment for further follow-up and care.  She is instructed to increase the water and fiber in her diet, make sure that she returns if any fever, chills, uncontrolled abdominal pain or intractable vomiting.  She is discharged in stable and improved condition        DISPOSITION AND DISCUSSIONS  I have discussed management of the patient with the following physicians and STEPHANIE's:  none    Discussion of management with other QHP or appropriate source(s): None     Barriers to care at this time, including but not limited to:  None .     Decision tools and prescription drugs considered including, but not limited to: Pain Medications tramadol .    FINAL DIAGNOSIS  1. Hemorrhagic ovarian cyst    2. Constipation, unspecified constipation type    3. Pelvic pain           Electronically signed by: Mami Hernandez D.O., 12/4/2023  10:02 PM

## 2023-12-05 NOTE — DISCHARGE INSTRUCTIONS
Follow-up with your gynecologist this Thursday as scheduled and take a copy of your ultrasound report with you so that they can follow-up with your hemorrhagic cyst.  Make sure that you are drinking plenty of water, high-fiber diet, raw vegetables, bran cereal.  Take the pain medications as directed with food and stool softeners as these can be very constipating as well.  Return if elevated fever or uncontrolled pain.

## 2023-12-05 NOTE — ED NOTES
Bedside report received from MORIS Sanderson. Pt is A&Ox4 and awake in bed. Pt on pulse ox and automatic BP. VSS, saturating above 95% on RA, HR in the 70s. Pt complains of 0/10 pain at this time. Call light within reach and pt updated on POC.

## 2024-02-09 ENCOUNTER — HOSPITAL ENCOUNTER (OUTPATIENT)
Dept: RADIOLOGY | Facility: MEDICAL CENTER | Age: 24
End: 2024-02-09
Attending: STUDENT IN AN ORGANIZED HEALTH CARE EDUCATION/TRAINING PROGRAM
Payer: COMMERCIAL

## 2024-02-09 DIAGNOSIS — N83.202 OVARIAN CYST, LEFT: ICD-10-CM

## 2024-02-09 PROCEDURE — 76830 TRANSVAGINAL US NON-OB: CPT

## 2024-03-05 NOTE — ED TRIAGE NOTES
Porfirio Keenan  23 y.o.  Chief Complaint   Patient presents with    Pelvic Pain     LEFT-sided  Worsening since Saturday  Exacerbated with movement  Describes pain as 7/10 stabbing/dull     Ambulatory to triage with steady gait for above. A & O x 4, GCS 15.    States that pain occasionally radiates down to L knee and around to L back.  Pain unrelieved with Ibuprofen taken at home.    Endorses small amount of vaginal spotting Saturday, none today. Patient has IUD In place.    Triage process explained to patient, apologized for wait time, and returned to lobby.  
24-Mar-2024

## 2024-04-18 NOTE — DISCHARGE INSTRUCTIONS
I can find no specific cause for this pain.  Is left-sided, your white blood cells are not considerably elevated, I do not suspect appendicitis at this time.  But it cannot be completely ruled out.  You do have a urinary tract infection you are being started on antibiotics.  Use Tylenol for pain.    Return if your pain worsens, changes, or if it is not resolved within 12 to 24 hours.   show

## 2024-07-15 ENCOUNTER — APPOINTMENT (OUTPATIENT)
Dept: RADIOLOGY | Facility: MEDICAL CENTER | Age: 24
End: 2024-07-15
Attending: EMERGENCY MEDICINE
Payer: COMMERCIAL

## 2024-07-15 ENCOUNTER — HOSPITAL ENCOUNTER (EMERGENCY)
Facility: MEDICAL CENTER | Age: 24
End: 2024-07-15
Attending: EMERGENCY MEDICINE
Payer: COMMERCIAL

## 2024-07-15 VITALS
BODY MASS INDEX: 28.69 KG/M2 | WEIGHT: 182.76 LBS | SYSTOLIC BLOOD PRESSURE: 112 MMHG | RESPIRATION RATE: 18 BRPM | HEART RATE: 89 BPM | DIASTOLIC BLOOD PRESSURE: 72 MMHG | HEIGHT: 67 IN | TEMPERATURE: 97.1 F | OXYGEN SATURATION: 98 %

## 2024-07-15 DIAGNOSIS — R10.30 LOWER ABDOMINAL PAIN: ICD-10-CM

## 2024-07-15 DIAGNOSIS — N30.90 CYSTITIS: ICD-10-CM

## 2024-07-15 LAB
ALBUMIN SERPL BCP-MCNC: 4.2 G/DL (ref 3.2–4.9)
ALBUMIN/GLOB SERPL: 1.2 G/DL
ALP SERPL-CCNC: 103 U/L (ref 30–99)
ALT SERPL-CCNC: 18 U/L (ref 2–50)
ANION GAP SERPL CALC-SCNC: 11 MMOL/L (ref 7–16)
APPEARANCE UR: CLEAR
AST SERPL-CCNC: 17 U/L (ref 12–45)
BACTERIA #/AREA URNS HPF: ABNORMAL /HPF
BASOPHILS # BLD AUTO: 0.4 % (ref 0–1.8)
BASOPHILS # BLD: 0.04 K/UL (ref 0–0.12)
BILIRUB SERPL-MCNC: 0.2 MG/DL (ref 0.1–1.5)
BILIRUB UR QL STRIP.AUTO: NEGATIVE
BUN SERPL-MCNC: 7 MG/DL (ref 8–22)
CALCIUM ALBUM COR SERPL-MCNC: 8.8 MG/DL (ref 8.5–10.5)
CALCIUM SERPL-MCNC: 9 MG/DL (ref 8.5–10.5)
CHLORIDE SERPL-SCNC: 103 MMOL/L (ref 96–112)
CO2 SERPL-SCNC: 22 MMOL/L (ref 20–33)
COLOR UR: YELLOW
CREAT SERPL-MCNC: 0.43 MG/DL (ref 0.5–1.4)
EOSINOPHIL # BLD AUTO: 0.09 K/UL (ref 0–0.51)
EOSINOPHIL NFR BLD: 0.9 % (ref 0–6.9)
EPI CELLS #/AREA URNS HPF: ABNORMAL /HPF
ERYTHROCYTE [DISTWIDTH] IN BLOOD BY AUTOMATED COUNT: 38.2 FL (ref 35.9–50)
GFR SERPLBLD CREATININE-BSD FMLA CKD-EPI: 140 ML/MIN/1.73 M 2
GLOBULIN SER CALC-MCNC: 3.5 G/DL (ref 1.9–3.5)
GLUCOSE SERPL-MCNC: 96 MG/DL (ref 65–99)
GLUCOSE UR STRIP.AUTO-MCNC: NEGATIVE MG/DL
HCG SERPL QL: NEGATIVE
HCT VFR BLD AUTO: 43.8 % (ref 37–47)
HGB BLD-MCNC: 13.7 G/DL (ref 12–16)
HYALINE CASTS #/AREA URNS LPF: ABNORMAL /LPF
IMM GRANULOCYTES # BLD AUTO: 0.03 K/UL (ref 0–0.11)
IMM GRANULOCYTES NFR BLD AUTO: 0.3 % (ref 0–0.9)
KETONES UR STRIP.AUTO-MCNC: NEGATIVE MG/DL
LEUKOCYTE ESTERASE UR QL STRIP.AUTO: ABNORMAL
LIPASE SERPL-CCNC: 38 U/L (ref 11–82)
LYMPHOCYTES # BLD AUTO: 2.86 K/UL (ref 1–4.8)
LYMPHOCYTES NFR BLD: 29.3 % (ref 22–41)
MCH RBC QN AUTO: 25.2 PG (ref 27–33)
MCHC RBC AUTO-ENTMCNC: 31.3 G/DL (ref 32.2–35.5)
MCV RBC AUTO: 80.5 FL (ref 81.4–97.8)
MICRO URNS: ABNORMAL
MONOCYTES # BLD AUTO: 0.6 K/UL (ref 0–0.85)
MONOCYTES NFR BLD AUTO: 6.1 % (ref 0–13.4)
NEUTROPHILS # BLD AUTO: 6.14 K/UL (ref 1.82–7.42)
NEUTROPHILS NFR BLD: 63 % (ref 44–72)
NITRITE UR QL STRIP.AUTO: NEGATIVE
NRBC # BLD AUTO: 0 K/UL
NRBC BLD-RTO: 0 /100 WBC (ref 0–0.2)
PH UR STRIP.AUTO: 6 [PH] (ref 5–8)
PLATELET # BLD AUTO: 368 K/UL (ref 164–446)
PMV BLD AUTO: 11.2 FL (ref 9–12.9)
POTASSIUM SERPL-SCNC: 3.8 MMOL/L (ref 3.6–5.5)
PROT SERPL-MCNC: 7.7 G/DL (ref 6–8.2)
PROT UR QL STRIP: NEGATIVE MG/DL
RBC # BLD AUTO: 5.44 M/UL (ref 4.2–5.4)
RBC # URNS HPF: ABNORMAL /HPF
RBC UR QL AUTO: NEGATIVE
SODIUM SERPL-SCNC: 136 MMOL/L (ref 135–145)
SP GR UR STRIP.AUTO: 1.02
UROBILINOGEN UR STRIP.AUTO-MCNC: 0.2 MG/DL
WBC # BLD AUTO: 9.8 K/UL (ref 4.8–10.8)
WBC #/AREA URNS HPF: ABNORMAL /HPF

## 2024-07-15 PROCEDURE — 96375 TX/PRO/DX INJ NEW DRUG ADDON: CPT

## 2024-07-15 PROCEDURE — 700111 HCHG RX REV CODE 636 W/ 250 OVERRIDE (IP): Mod: JZ,UD | Performed by: EMERGENCY MEDICINE

## 2024-07-15 PROCEDURE — 84703 CHORIONIC GONADOTROPIN ASSAY: CPT

## 2024-07-15 PROCEDURE — 36415 COLL VENOUS BLD VENIPUNCTURE: CPT

## 2024-07-15 PROCEDURE — 80053 COMPREHEN METABOLIC PANEL: CPT

## 2024-07-15 PROCEDURE — 81001 URINALYSIS AUTO W/SCOPE: CPT

## 2024-07-15 PROCEDURE — 96374 THER/PROPH/DIAG INJ IV PUSH: CPT

## 2024-07-15 PROCEDURE — 85025 COMPLETE CBC W/AUTO DIFF WBC: CPT

## 2024-07-15 PROCEDURE — 83690 ASSAY OF LIPASE: CPT

## 2024-07-15 PROCEDURE — 99285 EMERGENCY DEPT VISIT HI MDM: CPT

## 2024-07-15 PROCEDURE — 76830 TRANSVAGINAL US NON-OB: CPT

## 2024-07-15 RX ORDER — NITROFURANTOIN 25; 75 MG/1; MG/1
100 CAPSULE ORAL 2 TIMES DAILY
Qty: 6 CAPSULE | Refills: 0 | Status: ACTIVE | OUTPATIENT
Start: 2024-07-15 | End: 2024-07-18

## 2024-07-15 RX ORDER — KETOROLAC TROMETHAMINE 15 MG/ML
15 INJECTION, SOLUTION INTRAMUSCULAR; INTRAVENOUS ONCE
Status: COMPLETED | OUTPATIENT
Start: 2024-07-15 | End: 2024-07-15

## 2024-07-15 RX ADMIN — FENTANYL CITRATE 50 MCG: 50 INJECTION, SOLUTION INTRAMUSCULAR; INTRAVENOUS at 10:54

## 2024-07-15 RX ADMIN — KETOROLAC TROMETHAMINE 15 MG: 15 INJECTION, SOLUTION INTRAMUSCULAR; INTRAVENOUS at 12:02

## 2024-07-15 ASSESSMENT — PAIN DESCRIPTION - PAIN TYPE: TYPE: ACUTE PAIN

## 2024-07-15 ASSESSMENT — FIBROSIS 4 INDEX: FIB4 SCORE: 0.26

## 2024-07-22 ENCOUNTER — APPOINTMENT (OUTPATIENT)
Dept: ADMISSIONS | Facility: MEDICAL CENTER | Age: 24
End: 2024-07-22
Attending: OBSTETRICS & GYNECOLOGY
Payer: COMMERCIAL

## 2024-07-26 ENCOUNTER — PRE-ADMISSION TESTING (OUTPATIENT)
Dept: ADMISSIONS | Facility: MEDICAL CENTER | Age: 24
End: 2024-07-26
Attending: OBSTETRICS & GYNECOLOGY
Payer: COMMERCIAL

## 2024-07-26 RX ORDER — SEMAGLUTIDE 1.7 MG/.75ML
1.7 INJECTION, SOLUTION SUBCUTANEOUS
COMMUNITY
Start: 2024-06-27

## 2024-08-08 NOTE — OR NURSING
I called and spoke to the Sol the surgery scheduler for Dr. Modi. I notified her that Porfirio Keenan was seen in the ER on 7/15/24 and all the testing for her upcoming surgery on 8/20/24 was done on 7/15/24. She was given an antibiotic for possible cystitis. I faxed all the results of testing on 7/15/24 to Dr. Modi's office for MD review.

## 2024-08-12 NOTE — OR NURSING
I received a call from Zaida at Dr. Modi's office who states that Porfirio Keenan had a urine culture done last week at her PCP office that was negative. Zaida states Dr. Modi told her that a urine culture does not need to be repeated prior to surgery.

## 2024-08-19 ENCOUNTER — ANESTHESIA EVENT (OUTPATIENT)
Dept: SURGERY | Facility: MEDICAL CENTER | Age: 24
End: 2024-08-19
Payer: COMMERCIAL

## 2024-08-20 ENCOUNTER — ANESTHESIA (OUTPATIENT)
Dept: SURGERY | Facility: MEDICAL CENTER | Age: 24
End: 2024-08-20
Payer: COMMERCIAL

## 2024-08-20 ENCOUNTER — HOSPITAL ENCOUNTER (OUTPATIENT)
Facility: MEDICAL CENTER | Age: 24
End: 2024-08-20
Attending: OBSTETRICS & GYNECOLOGY | Admitting: OBSTETRICS & GYNECOLOGY
Payer: COMMERCIAL

## 2024-08-20 VITALS
WEIGHT: 179.9 LBS | SYSTOLIC BLOOD PRESSURE: 115 MMHG | HEIGHT: 67 IN | TEMPERATURE: 97.6 F | HEART RATE: 94 BPM | DIASTOLIC BLOOD PRESSURE: 64 MMHG | RESPIRATION RATE: 16 BRPM | OXYGEN SATURATION: 96 % | BODY MASS INDEX: 28.24 KG/M2

## 2024-08-20 LAB
HCG UR QL: NEGATIVE
PATHOLOGY CONSULT NOTE: NORMAL

## 2024-08-20 PROCEDURE — 160048 HCHG OR STATISTICAL LEVEL 1-5: Performed by: OBSTETRICS & GYNECOLOGY

## 2024-08-20 PROCEDURE — 160036 HCHG PACU - EA ADDL 30 MINS PHASE I: Performed by: OBSTETRICS & GYNECOLOGY

## 2024-08-20 PROCEDURE — 700111 HCHG RX REV CODE 636 W/ 250 OVERRIDE (IP): Mod: JZ,UD | Performed by: ANESTHESIOLOGY

## 2024-08-20 PROCEDURE — 88305 TISSUE EXAM BY PATHOLOGIST: CPT

## 2024-08-20 PROCEDURE — 700111 HCHG RX REV CODE 636 W/ 250 OVERRIDE (IP): Mod: UD | Performed by: ANESTHESIOLOGY

## 2024-08-20 PROCEDURE — A9270 NON-COVERED ITEM OR SERVICE: HCPCS | Mod: UD | Performed by: ANESTHESIOLOGY

## 2024-08-20 PROCEDURE — 160002 HCHG RECOVERY MINUTES (STAT): Performed by: OBSTETRICS & GYNECOLOGY

## 2024-08-20 PROCEDURE — 160039 HCHG SURGERY MINUTES - EA ADDL 1 MIN LEVEL 3: Performed by: OBSTETRICS & GYNECOLOGY

## 2024-08-20 PROCEDURE — 160035 HCHG PACU - 1ST 60 MINS PHASE I: Performed by: OBSTETRICS & GYNECOLOGY

## 2024-08-20 PROCEDURE — 700102 HCHG RX REV CODE 250 W/ 637 OVERRIDE(OP): Mod: UD | Performed by: ANESTHESIOLOGY

## 2024-08-20 PROCEDURE — 700105 HCHG RX REV CODE 258: Mod: UD | Performed by: OBSTETRICS & GYNECOLOGY

## 2024-08-20 PROCEDURE — 160028 HCHG SURGERY MINUTES - 1ST 30 MINS LEVEL 3: Performed by: OBSTETRICS & GYNECOLOGY

## 2024-08-20 PROCEDURE — 700101 HCHG RX REV CODE 250: Mod: UD | Performed by: ANESTHESIOLOGY

## 2024-08-20 PROCEDURE — 160009 HCHG ANES TIME/MIN: Performed by: OBSTETRICS & GYNECOLOGY

## 2024-08-20 PROCEDURE — 160046 HCHG PACU - 1ST 60 MINS PHASE II: Performed by: OBSTETRICS & GYNECOLOGY

## 2024-08-20 PROCEDURE — 81025 URINE PREGNANCY TEST: CPT

## 2024-08-20 PROCEDURE — 160025 RECOVERY II MINUTES (STATS): Performed by: OBSTETRICS & GYNECOLOGY

## 2024-08-20 RX ORDER — HYDROMORPHONE HYDROCHLORIDE 1 MG/ML
0.1 INJECTION, SOLUTION INTRAMUSCULAR; INTRAVENOUS; SUBCUTANEOUS
Status: DISCONTINUED | OUTPATIENT
Start: 2024-08-20 | End: 2024-08-20 | Stop reason: HOSPADM

## 2024-08-20 RX ORDER — DEXAMETHASONE SODIUM PHOSPHATE 4 MG/ML
INJECTION, SOLUTION INTRA-ARTICULAR; INTRALESIONAL; INTRAMUSCULAR; INTRAVENOUS; SOFT TISSUE PRN
Status: DISCONTINUED | OUTPATIENT
Start: 2024-08-20 | End: 2024-08-20 | Stop reason: SURG

## 2024-08-20 RX ORDER — MIDAZOLAM HYDROCHLORIDE 1 MG/ML
INJECTION INTRAMUSCULAR; INTRAVENOUS PRN
Status: DISCONTINUED | OUTPATIENT
Start: 2024-08-20 | End: 2024-08-20 | Stop reason: SURG

## 2024-08-20 RX ORDER — ROCURONIUM BROMIDE 10 MG/ML
INJECTION, SOLUTION INTRAVENOUS PRN
Status: DISCONTINUED | OUTPATIENT
Start: 2024-08-20 | End: 2024-08-20 | Stop reason: SURG

## 2024-08-20 RX ORDER — CEFAZOLIN SODIUM 1 G/3ML
INJECTION, POWDER, FOR SOLUTION INTRAMUSCULAR; INTRAVENOUS PRN
Status: DISCONTINUED | OUTPATIENT
Start: 2024-08-20 | End: 2024-08-20 | Stop reason: SURG

## 2024-08-20 RX ORDER — DIPHENHYDRAMINE HYDROCHLORIDE 50 MG/ML
12.5 INJECTION INTRAMUSCULAR; INTRAVENOUS
Status: DISCONTINUED | OUTPATIENT
Start: 2024-08-20 | End: 2024-08-20 | Stop reason: HOSPADM

## 2024-08-20 RX ORDER — SODIUM CHLORIDE, SODIUM LACTATE, POTASSIUM CHLORIDE, CALCIUM CHLORIDE 600; 310; 30; 20 MG/100ML; MG/100ML; MG/100ML; MG/100ML
INJECTION, SOLUTION INTRAVENOUS CONTINUOUS
Status: DISCONTINUED | OUTPATIENT
Start: 2024-08-20 | End: 2024-08-20 | Stop reason: HOSPADM

## 2024-08-20 RX ORDER — KETOROLAC TROMETHAMINE 15 MG/ML
INJECTION, SOLUTION INTRAMUSCULAR; INTRAVENOUS PRN
Status: DISCONTINUED | OUTPATIENT
Start: 2024-08-20 | End: 2024-08-20 | Stop reason: SURG

## 2024-08-20 RX ORDER — HYDROMORPHONE HYDROCHLORIDE 1 MG/ML
0.2 INJECTION, SOLUTION INTRAMUSCULAR; INTRAVENOUS; SUBCUTANEOUS
Status: DISCONTINUED | OUTPATIENT
Start: 2024-08-20 | End: 2024-08-20 | Stop reason: HOSPADM

## 2024-08-20 RX ORDER — ONDANSETRON 2 MG/ML
INJECTION INTRAMUSCULAR; INTRAVENOUS PRN
Status: DISCONTINUED | OUTPATIENT
Start: 2024-08-20 | End: 2024-08-20 | Stop reason: SURG

## 2024-08-20 RX ORDER — HYDROMORPHONE HYDROCHLORIDE 1 MG/ML
0.4 INJECTION, SOLUTION INTRAMUSCULAR; INTRAVENOUS; SUBCUTANEOUS
Status: DISCONTINUED | OUTPATIENT
Start: 2024-08-20 | End: 2024-08-20 | Stop reason: HOSPADM

## 2024-08-20 RX ORDER — ONDANSETRON 2 MG/ML
4 INJECTION INTRAMUSCULAR; INTRAVENOUS
Status: DISCONTINUED | OUTPATIENT
Start: 2024-08-20 | End: 2024-08-20 | Stop reason: HOSPADM

## 2024-08-20 RX ORDER — OXYCODONE HCL 5 MG/5 ML
5 SOLUTION, ORAL ORAL
Status: COMPLETED | OUTPATIENT
Start: 2024-08-20 | End: 2024-08-20

## 2024-08-20 RX ORDER — OXYCODONE HCL 5 MG/5 ML
10 SOLUTION, ORAL ORAL
Status: COMPLETED | OUTPATIENT
Start: 2024-08-20 | End: 2024-08-20

## 2024-08-20 RX ADMIN — DEXAMETHASONE SODIUM PHOSPHATE 8 MG: 4 INJECTION INTRA-ARTICULAR; INTRALESIONAL; INTRAMUSCULAR; INTRAVENOUS; SOFT TISSUE at 11:47

## 2024-08-20 RX ADMIN — ROCURONIUM BROMIDE 10 MG: 50 INJECTION, SOLUTION INTRAVENOUS at 12:18

## 2024-08-20 RX ADMIN — ONDANSETRON 8 MG: 2 INJECTION INTRAMUSCULAR; INTRAVENOUS at 12:43

## 2024-08-20 RX ADMIN — MIDAZOLAM HYDROCHLORIDE 2 MG: 2 INJECTION, SOLUTION INTRAMUSCULAR; INTRAVENOUS at 12:50

## 2024-08-20 RX ADMIN — SODIUM CHLORIDE, POTASSIUM CHLORIDE, SODIUM LACTATE AND CALCIUM CHLORIDE: 600; 310; 30; 20 INJECTION, SOLUTION INTRAVENOUS at 11:27

## 2024-08-20 RX ADMIN — KETOROLAC TROMETHAMINE 15 MG: 15 INJECTION, SOLUTION INTRAMUSCULAR; INTRAVENOUS at 12:43

## 2024-08-20 RX ADMIN — FENTANYL CITRATE 100 MCG: 50 INJECTION, SOLUTION INTRAMUSCULAR; INTRAVENOUS at 11:38

## 2024-08-20 RX ADMIN — PROPOFOL 200 MG: 10 INJECTION, EMULSION INTRAVENOUS at 11:42

## 2024-08-20 RX ADMIN — OXYCODONE HYDROCHLORIDE 10 MG: 5 SOLUTION ORAL at 13:09

## 2024-08-20 RX ADMIN — ROCURONIUM BROMIDE 50 MG: 50 INJECTION, SOLUTION INTRAVENOUS at 11:43

## 2024-08-20 RX ADMIN — CEFAZOLIN 2 G: 1 INJECTION, POWDER, FOR SOLUTION INTRAMUSCULAR; INTRAVENOUS at 11:46

## 2024-08-20 RX ADMIN — FENTANYL CITRATE 100 MCG: 50 INJECTION, SOLUTION INTRAMUSCULAR; INTRAVENOUS at 12:33

## 2024-08-20 RX ADMIN — FENTANYL CITRATE 50 MCG: 50 INJECTION, SOLUTION INTRAMUSCULAR; INTRAVENOUS at 12:52

## 2024-08-20 RX ADMIN — SUGAMMADEX 200 MG: 100 INJECTION, SOLUTION INTRAVENOUS at 12:43

## 2024-08-20 ASSESSMENT — PAIN SCALES - GENERAL: PAIN_LEVEL: 0

## 2024-08-20 ASSESSMENT — PAIN DESCRIPTION - PAIN TYPE
TYPE: SURGICAL PAIN

## 2024-08-20 ASSESSMENT — FIBROSIS 4 INDEX: FIB4 SCORE: 0.25

## 2024-08-20 NOTE — OP REPORT
Laparoscopy Procedure Note     Name: Porfirio Keenan MRN [id number]   YOB: 2000   Date: 8/20/2024   Time: 12:48 PM   Pre-operative Diagnosis: Pelvic pain, dyspareunia, dysmenorrhea   Post-operatIve Diagnosis: same, endometriosis, bilateral follicular ovarian cysts, endometriosis of the right and left posterior broad ligament peritoneum.    Surgeons and Role:     * Aureliano Modi D.O. - Primary      * Giovana Ferreira PA-C    Anesthesiologist: Rip Whitman M.D.   Anesthesia: General     Estimated Blood Loss: Minimal  Complications: none  Findings: Normal appearing uterus, bilateral fallopian tubes, bilateral ovaries with multiple follicular cysts.  Specimens:   ID Type Source Tests Collected by Time Destination   A : Right Posterior Broad Ligament Peritoneum Endometriosis Other Other PATHOLOGY SPECIMEN Aureliano Modi D.O. 8/20/2024 12:22 PM        Operation: Diagnostic Laparoscopy, Excision of Endometriosis, Bilateral Ovarian Drilling.    Procedure Details:  The risks, benefits, and alternatives of the planned procedure were discussed with the patient, and informed consent was obtained prior to surgery.  The patient was then taken to the operating room with IV running.  Patient was identified and time out procedure was performed.  She was then laid on the operating room table and given general anesthesia, which was found to be adequate.  She was then situated in the dorsal lithotomy position in [facility] and then prepped and draped in the usual fashion for a laparoscopic procedure.  A Bey catheter was inserted into the bladder and allowed to drain to gravity.      Attention was first placed to the vagina.  A weighted speculum was placed in the vagina and the anterior lip of the cervix was grasped with a tenaculum.  The uterus was sounded to 9 cm.  The cervix was then serially dilated.  A [employer] uterine manipulator was inserted into the uterine cavity.    Attention was then turned to the  abdomen.  An 11-mm vertical incision was then made using the scalpel at the umbilicus.  The fascia was identified, grasped with clamps and entered using a scalpel.  The peritoneum was bluntly entered.  The 11-mm Hasaan trocar was inserted and CO2 gas was used to insufflate the abdominal cavity.  The 10-mm operative laparoscope was inserted and the abdominal cavity was inspected.      Subsequently, one other 5 mm trocar was placed under direct visual guidance in the left lower quadrant of the abdomen.  Endometriosis was noted in the above areas.  These were excised using the Gold laser and sent to pathology.  Excellent hemostasis was noted.  Multiple bilateral loculated follicular ovarian cysts were seen.  Ovarian drilling was performed using the Gold laser to release the loculations.  The ovaries appeared normal following the drilling  The rest of the pelvis appear normal following the excisions.  All instruments were removed from the abdomen.  The V-care was removed from the vagina.  The leigh was also removed.  The fascia was closed at the 10-mm trocar site using 0 vicryl suture.  The skin was closed using 4-0 Monocryl at the umbilicus and Dermabond at all trocar sites.  Sponge, needle, and instrument counts were correct times two.            Disposition: PACU - hemodynamically stable.  Condition: stable    Attending Attestation: I was present and scrubbed for the entire procedure.    Aureliano Modi D.O.

## 2024-08-20 NOTE — ANESTHESIA PROCEDURE NOTES
Airway    Date/Time: 8/20/2024 11:44 AM    Performed by: Rip Whitman M.D.  Authorized by: Rip Whitman M.D.    Location:  OR  Urgency:  Elective  Indications for Airway Management:  Anesthesia      Spontaneous Ventilation: absent    Sedation Level:  Deep  Preoxygenated: Yes    Patient Position:  Sniffing  Mask Difficulty Assessment:  1 - vent by mask  Final Airway Type:  Endotracheal airway  Final Endotracheal Airway:  ETT  Cuffed: Yes    Technique Used for Successful ETT Placement:  Direct laryngoscopy    Insertion Site:  Oral  Blade Type:  Glide  Laryngoscope Blade/Videolaryngoscope Blade Size:  3  ETT Size (mm):  7.0  Measured from:  Teeth  ETT to Teeth (cm):  21  Placement Verified by: auscultation and capnometry    Cormack-Lehane Classification:  Grade I - full view of glottis  Number of Attempts at Approach:  1   SIVI, ATET, no teeth contact, soft bite block

## 2024-08-20 NOTE — ANESTHESIA POSTPROCEDURE EVALUATION
Patient: Porfirio Keenan    Procedure Summary       Date: 08/20/24 Room / Location: [facility] ROOM 27 / SURGERY SAME DAY [facility]    Anesthesia Start: 1135 Anesthesia Stop: 1308    Procedure: DIAGNOSTIC LAPAROSCOPY,EXCISION/ TREATMENT OF ENDOMETRIOSIS, BILATERAL OVARIAN DRILLING (Pelvis) Diagnosis: (PELVIC PAIN, DEEP DYSPAREUNIA,ENDOMETRIOSIS)    Surgeons: Aureliano Modi D.O. Responsible Provider: Rip Whitman M.D.    Anesthesia Type: general ASA Status: 2            Final Anesthesia Type: general  Last vitals  BP   Blood Pressure: 131/78    Temp   36.6 °C (97.9 °F)    Pulse   93   Resp   13    SpO2   99 %      Anesthesia Post Evaluation    Patient location during evaluation: PACU  Patient participation: complete - patient participated  Level of consciousness: awake and alert  Pain score: 0    Airway patency: patent  Anesthetic complications: no  Cardiovascular status: hemodynamically stable  Respiratory status: acceptable  Hydration status: euvolemic    PONV: none          No notable events documented.     Nurse Pain Score: 5 (NPRS)

## 2024-08-20 NOTE — DISCHARGE INSTRUCTIONS
What to Expect Post Anesthesia    Rest and take it easy for the first 24 hours.  A responsible adult is recommended to remain with you during that time.  It is normal to feel sleepy.  We encourage you to not do anything that requires balance, judgment or coordination.    FOR 24 HOURS DO NOT:  Drive, operate machinery or run household appliances.  Drink beer or alcoholic beverages.  Make important decisions or sign legal documents.    To avoid nausea, slowly advance diet as tolerated, avoiding spicy or greasy foods for the first day.  Add more substantial food to your diet according to your provider's instructions. INCREASE FLUIDS AND FIBER TO AVOID CONSTIPATION.    MILD FLU-LIKE SYMPTOMS ARE NORMAL.  YOU MAY EXPERIENCE GENERALIZED MUSCLE ACHES, THROAT IRRITATION, HEADACHE AND/OR SOME NAUSEA.    If any questions arise, call your provider.  Dr. Modi's telephone #: [phone]  If your provider is not available, please feel free to call the Surgical Center at [phone].    MEDICATIONS: Resume taking daily medication.  Take prescribed pain medication with food.  If no medication is prescribed, you may take non-aspirin pain medication if needed.  PAIN MEDICATION CAN BE VERY CONSTIPATING.  Take a stool softener or laxative such as senokot, pericolace, or milk of magnesia if needed.    Last pain medication given at 1:10pm (oxycodone given)    Toradol (NSAID similar to ibuprofen/Motrin) given at 12:45pm. You may take ibuprofen if needed at 6:45pm.

## 2024-08-20 NOTE — ANESTHESIA PREPROCEDURE EVALUATION
Case: [id number] Date/Time: 08/20/24 1215    Procedure: DIAGNOSTIC LAPAROSCOPY, POSSIBLE EXCISION/ TREATMENT OF ENDOMETRIOSIS    Pre-op diagnosis: PELVIC PAIN, DEEP DYSPAREUNIA    Location: [facility] ROOM 27 / SURGERY SAME DAY [facility]    Surgeons: Aureliano Modi D.O.            Relevant Problems   NEURO   (positive) Headache       Physical Exam    Airway   Mallampati: II  TM distance: >3 FB  Neck ROM: full       Cardiovascular - normal exam  Rhythm: regular  Rate: normal  (-) murmur     Dental - normal exam           Pulmonary - normal exam  Breath sounds clear to auscultation     Abdominal    Neurological - normal exam                   Anesthesia Plan    ASA 2       Plan - general       Airway plan will be ETT          Induction: intravenous    Postoperative Plan: Postoperative administration of opioids is intended.    Pertinent diagnostic labs and testing reviewed    Informed Consent:    Anesthetic plan and risks discussed with patient.    Use of blood products discussed with: patient whom consented to blood products.

## 2024-08-20 NOTE — ANESTHESIA TIME REPORT
Anesthesia Start and Stop Event Times       Date Time Event    8/20/2024 1109 Ready for Procedure     1135 Anesthesia Start     1308 Anesthesia Stop          Responsible Staff  08/20/24      Name Role Begin End    Rip Whitman M.D. Anesth 1135 1308          Overtime Reason:  no overtime (within assigned shift)    Comments:

## 2024-08-20 NOTE — OR NURSING
1418-report from April, RN  pt to BR via yolanda    1425-pt able to void, mom brought back and will assist pt with dressing.     1435-discharge instructions given to pt and mom- verbalize understanding    1455- pt escorted out in w/c by MORIS Sosa.  All belongings accounted for.

## 2024-08-20 NOTE — OR NURSING
1300- Pt to PACU from OR. Report from anesthesia and OR RN. On 6L O2 via mask. Respirations even and unlabored. VSS. Abdominal lap sites with bandaids, CDI x2. Peripad in place with no bleeding noted.    1309- pt medicated per MAR.    1338 - Update given over the phone to patient's mother Shania, patient resting comfortably states pain is 4/10 and tolerable, patient would like to continue to rest for now.    1415- Pt to phase II via [facility] with all belongings. Up to bathroom. Report to Deloris SUBRAMANIAN.

## 2024-09-17 ENCOUNTER — APPOINTMENT (OUTPATIENT)
Dept: RADIOLOGY | Facility: MEDICAL CENTER | Age: 24
End: 2024-09-17
Attending: EMERGENCY MEDICINE
Payer: COMMERCIAL

## 2024-09-17 ENCOUNTER — HOSPITAL ENCOUNTER (EMERGENCY)
Facility: MEDICAL CENTER | Age: 24
End: 2024-09-17
Attending: EMERGENCY MEDICINE
Payer: COMMERCIAL

## 2024-09-17 VITALS
HEART RATE: 93 BPM | DIASTOLIC BLOOD PRESSURE: 57 MMHG | TEMPERATURE: 98.7 F | OXYGEN SATURATION: 94 % | BODY MASS INDEX: 29.2 KG/M2 | SYSTOLIC BLOOD PRESSURE: 115 MMHG | RESPIRATION RATE: 16 BRPM | HEIGHT: 67 IN | WEIGHT: 186.07 LBS

## 2024-09-17 DIAGNOSIS — N83.202 CYSTS OF BOTH OVARIES: ICD-10-CM

## 2024-09-17 DIAGNOSIS — G89.29 CHRONIC PAIN IN FEMALE PELVIS: ICD-10-CM

## 2024-09-17 DIAGNOSIS — Z87.42 HISTORY OF ENDOMETRIOSIS: ICD-10-CM

## 2024-09-17 DIAGNOSIS — R10.2 CHRONIC PAIN IN FEMALE PELVIS: ICD-10-CM

## 2024-09-17 DIAGNOSIS — N83.201 CYSTS OF BOTH OVARIES: ICD-10-CM

## 2024-09-17 LAB
ALBUMIN SERPL BCP-MCNC: 4 G/DL (ref 3.2–4.9)
ALBUMIN/GLOB SERPL: 1.4 G/DL
ALP SERPL-CCNC: 102 U/L (ref 30–99)
ALT SERPL-CCNC: 19 U/L (ref 2–50)
ANION GAP SERPL CALC-SCNC: 14 MMOL/L (ref 7–16)
APPEARANCE UR: ABNORMAL
AST SERPL-CCNC: 22 U/L (ref 12–45)
BACTERIA #/AREA URNS HPF: NEGATIVE /HPF
BASOPHILS # BLD AUTO: 0.3 % (ref 0–1.8)
BASOPHILS # BLD: 0.03 K/UL (ref 0–0.12)
BILIRUB SERPL-MCNC: <0.2 MG/DL (ref 0.1–1.5)
BILIRUB UR QL STRIP.AUTO: NEGATIVE
BUN SERPL-MCNC: 10 MG/DL (ref 8–22)
CALCIUM ALBUM COR SERPL-MCNC: 8.9 MG/DL (ref 8.5–10.5)
CALCIUM SERPL-MCNC: 8.9 MG/DL (ref 8.5–10.5)
CHLORIDE SERPL-SCNC: 104 MMOL/L (ref 96–112)
CO2 SERPL-SCNC: 22 MMOL/L (ref 20–33)
COLOR UR: YELLOW
CREAT SERPL-MCNC: 0.63 MG/DL (ref 0.5–1.4)
EOSINOPHIL # BLD AUTO: 0.12 K/UL (ref 0–0.51)
EOSINOPHIL NFR BLD: 1.1 % (ref 0–6.9)
EPI CELLS #/AREA URNS HPF: NEGATIVE /HPF
ERYTHROCYTE [DISTWIDTH] IN BLOOD BY AUTOMATED COUNT: 39.4 FL (ref 35.9–50)
GFR SERPLBLD CREATININE-BSD FMLA CKD-EPI: 128 ML/MIN/1.73 M 2
GLOBULIN SER CALC-MCNC: 2.8 G/DL (ref 1.9–3.5)
GLUCOSE SERPL-MCNC: 92 MG/DL (ref 65–99)
GLUCOSE UR STRIP.AUTO-MCNC: NEGATIVE MG/DL
HCG SERPL QL: NEGATIVE
HCT VFR BLD AUTO: 35.7 % (ref 37–47)
HGB BLD-MCNC: 11.2 G/DL (ref 12–16)
HYALINE CASTS #/AREA URNS LPF: ABNORMAL /LPF
IMM GRANULOCYTES # BLD AUTO: 0.03 K/UL (ref 0–0.11)
IMM GRANULOCYTES NFR BLD AUTO: 0.3 % (ref 0–0.9)
KETONES UR STRIP.AUTO-MCNC: NEGATIVE MG/DL
LEUKOCYTE ESTERASE UR QL STRIP.AUTO: NEGATIVE
LYMPHOCYTES # BLD AUTO: 3.22 K/UL (ref 1–4.8)
LYMPHOCYTES NFR BLD: 29.2 % (ref 22–41)
MCH RBC QN AUTO: 24.5 PG (ref 27–33)
MCHC RBC AUTO-ENTMCNC: 31.4 G/DL (ref 32.2–35.5)
MCV RBC AUTO: 77.9 FL (ref 81.4–97.8)
MICRO URNS: ABNORMAL
MONOCYTES # BLD AUTO: 0.66 K/UL (ref 0–0.85)
MONOCYTES NFR BLD AUTO: 6 % (ref 0–13.4)
NEUTROPHILS # BLD AUTO: 6.96 K/UL (ref 1.82–7.42)
NEUTROPHILS NFR BLD: 63.1 % (ref 44–72)
NITRITE UR QL STRIP.AUTO: NEGATIVE
NRBC # BLD AUTO: 0 K/UL
NRBC BLD-RTO: 0 /100 WBC (ref 0–0.2)
PH UR STRIP.AUTO: 8 [PH] (ref 5–8)
PLATELET # BLD AUTO: 336 K/UL (ref 164–446)
PMV BLD AUTO: 11.3 FL (ref 9–12.9)
POTASSIUM SERPL-SCNC: 4 MMOL/L (ref 3.6–5.5)
PROT SERPL-MCNC: 6.8 G/DL (ref 6–8.2)
PROT UR QL STRIP: 30 MG/DL
RBC # BLD AUTO: 4.58 M/UL (ref 4.2–5.4)
RBC # URNS HPF: ABNORMAL /HPF
RBC UR QL AUTO: NEGATIVE
SODIUM SERPL-SCNC: 140 MMOL/L (ref 135–145)
SP GR UR STRIP.AUTO: 1.03
UROBILINOGEN UR STRIP.AUTO-MCNC: 0.2 MG/DL
WBC # BLD AUTO: 11 K/UL (ref 4.8–10.8)
WBC #/AREA URNS HPF: ABNORMAL /HPF

## 2024-09-17 PROCEDURE — 99284 EMERGENCY DEPT VISIT MOD MDM: CPT

## 2024-09-17 PROCEDURE — 700111 HCHG RX REV CODE 636 W/ 250 OVERRIDE (IP): Mod: JZ | Performed by: EMERGENCY MEDICINE

## 2024-09-17 PROCEDURE — 85025 COMPLETE CBC W/AUTO DIFF WBC: CPT

## 2024-09-17 PROCEDURE — 96372 THER/PROPH/DIAG INJ SC/IM: CPT

## 2024-09-17 PROCEDURE — 80053 COMPREHEN METABOLIC PANEL: CPT

## 2024-09-17 PROCEDURE — 84703 CHORIONIC GONADOTROPIN ASSAY: CPT

## 2024-09-17 PROCEDURE — 76830 TRANSVAGINAL US NON-OB: CPT

## 2024-09-17 PROCEDURE — 81001 URINALYSIS AUTO W/SCOPE: CPT

## 2024-09-17 PROCEDURE — 36415 COLL VENOUS BLD VENIPUNCTURE: CPT

## 2024-09-17 RX ORDER — OXYCODONE AND ACETAMINOPHEN 5; 325 MG/1; MG/1
1 TABLET ORAL EVERY 8 HOURS PRN
Qty: 9 TABLET | Refills: 0 | Status: SHIPPED | OUTPATIENT
Start: 2024-09-17 | End: 2024-09-20

## 2024-09-17 RX ORDER — KETOROLAC TROMETHAMINE 15 MG/ML
15 INJECTION, SOLUTION INTRAMUSCULAR; INTRAVENOUS ONCE
Status: COMPLETED | OUTPATIENT
Start: 2024-09-17 | End: 2024-09-17

## 2024-09-17 RX ADMIN — KETOROLAC TROMETHAMINE 15 MG: 15 INJECTION, SOLUTION INTRAMUSCULAR; INTRAVENOUS at 18:52

## 2024-09-17 ASSESSMENT — FIBROSIS 4 INDEX: FIB4 SCORE: 0.25

## 2024-09-17 NOTE — ED PROVIDER NOTES
ED Provider Note    CHIEF COMPLAINT  Chief Complaint   Patient presents with    Abdominal Pain     LLQ pain x 3 days worst today.     Vaginal Bleeding     Since this morning. Recently had an IUD placed last week.        EXTERNAL RECORDS REVIEWED  August 20 of this year patient underwent diagnostic laparoscopic treatment for endometriosis and bilateral ovarian drilling secondary to pelvic pain dyspareunia and dysmenorrhea.  This was done by Dr. Modi.    Patient was seen in the emergency department with lower abdominal pain on July 15.  She was diagnosed with lower abdominal pain and cystitis.    HPI/ROS  LIMITATION TO HISTORY   Select: : None  OUTSIDE HISTORIAN(S):  None    Porfirio Keenan is a 24 y.o. female who presents to the emergency department with a chief complaint of pelvic pain.  Pelvic pain is something that she has chronically.  She has had it for almost a year.  This particular episode is lasted for 3 days.  Typically, she is able to take ibuprofen and this will help manage her symptoms but she took ibuprofen and she is continuing to have pain.  She called her doctor's office and was told it was likely related to her recent IUD placement, a week ago but she has had an IUD in the past and never had pain like this.  She denies a fever.  She does have associated nausea but no vomiting.  Typically when she has pelvic pain related to her cysts, she gets constipated this has been the case recently.  The IUD she had placed is a Mirena.  She is not on any other exogenous hormones.  Recently obtained a diagnosis of endometriosis having undergone an exploratory laparotomy.  Up to that point, she had just been having chronic pelvic pain.  She does report pressure with urination which is a common symptoms for her.    PAST MEDICAL HISTORY   has a past medical history of Acid reflux, Anemia, Anxiety disorder (07/26/2024), Bowel habit changes (07/26/2024), Colitis, Gynecological disorder, Hypertension (07/26/2024), Pain  "(07/26/2024), Postpartum depression (07/26/2024), and PTSD (post-traumatic stress disorder) (07/26/2024).    SURGICAL HISTORY   has a past surgical history that includes no pertinent past surgical history and lap,diagnostic abdomen (N/A, 8/20/2024).    FAMILY HISTORY  History reviewed. No pertinent family history.    SOCIAL HISTORY  Social History     Tobacco Use    Smoking status: Never     Passive exposure: Never    Smokeless tobacco: Never   Vaping Use    Vaping status: Former    Substances: Nicotine, Flavoring    Devices: Disposable   Substance and Sexual Activity    Alcohol use: Yes     Comment: occasional about 3 drinks/month    Drug use: No    Sexual activity: Not on file       CURRENT MEDICATIONS  Home Medications       Reviewed by Susana Stoddard R.N. (Registered Nurse) on 09/17/24 at 1629  Med List Status: Partial     Medication Last Dose Status   ibuprofen (MOTRIN) 800 MG Tab  Active   WEGOVY 1.7 MG/0.75ML Solution Auto-injector Pen-injector  Active                    ALLERGIES  No Known Allergies    PHYSICAL EXAM  VITAL SIGNS: /57   Pulse 93   Temp 37.1 °C (98.7 °F) (Temporal)   Resp 16   Ht 1.702 m (5' 7\")   Wt 84.4 kg (186 lb 1.1 oz)   LMP 09/06/2024   SpO2 94%   BMI 29.14 kg/m²    Vitals reviewed.  Constitutional: Patient is oriented to person, place, and time. Appears well-developed and well-nourished. Mild distress.    Head: Normocephalic and atraumatic.   Ears: Normal external ears bilaterally.   Mouth/Throat: Oropharynx is clear.  Eyes: Conjunctivae are normal. Pupils are equal, round.   Neck: Normal range of motion. Neck supple.   Cardiovascular: Normal rate, regular rhythm and normal heart sounds.   Pulmonary/Chest: Effort normal and breath sounds normal. No respiratory distress, no wheezes, rhonchi, or rales.   Abdominal: Soft. Bowel sounds are normal. There is left lower tenderness. No rebound or guarding, or peritoneal signs. No CVA tenderness.  Musculoskeletal: No edema and no " tenderness.   Neurological: Normal gait. No focal deficits.   Skin: Skin is warm and dry. No erythema. No pallor.   Psychiatric: Patient has a normal mood and affect.     EKG/LABS  Results for orders placed or performed during the hospital encounter of 09/17/24   CBC WITH DIFFERENTIAL   Result Value Ref Range    WBC 11.0 (H) 4.8 - 10.8 K/uL    RBC 4.58 4.20 - 5.40 M/uL    Hemoglobin 11.2 (L) 12.0 - 16.0 g/dL    Hematocrit 35.7 (L) 37.0 - 47.0 %    MCV 77.9 (L) 81.4 - 97.8 fL    MCH 24.5 (L) 27.0 - 33.0 pg    MCHC 31.4 (L) 32.2 - 35.5 g/dL    RDW 39.4 35.9 - 50.0 fL    Platelet Count 336 164 - 446 K/uL    MPV 11.3 9.0 - 12.9 fL    Neutrophils-Polys 63.10 44.00 - 72.00 %    Lymphocytes 29.20 22.00 - 41.00 %    Monocytes 6.00 0.00 - 13.40 %    Eosinophils 1.10 0.00 - 6.90 %    Basophils 0.30 0.00 - 1.80 %    Immature Granulocytes 0.30 0.00 - 0.90 %    Nucleated RBC 0.00 0.00 - 0.20 /100 WBC    Neutrophils (Absolute) 6.96 1.82 - 7.42 K/uL    Lymphs (Absolute) 3.22 1.00 - 4.80 K/uL    Monos (Absolute) 0.66 0.00 - 0.85 K/uL    Eos (Absolute) 0.12 0.00 - 0.51 K/uL    Baso (Absolute) 0.03 0.00 - 0.12 K/uL    Immature Granulocytes (abs) 0.03 0.00 - 0.11 K/uL    NRBC (Absolute) 0.00 K/uL   COMP METABOLIC PANEL   Result Value Ref Range    Sodium 140 135 - 145 mmol/L    Potassium 4.0 3.6 - 5.5 mmol/L    Chloride 104 96 - 112 mmol/L    Co2 22 20 - 33 mmol/L    Anion Gap 14.0 7.0 - 16.0    Glucose 92 65 - 99 mg/dL    Bun 10 8 - 22 mg/dL    Creatinine 0.63 0.50 - 1.40 mg/dL    Calcium 8.9 8.5 - 10.5 mg/dL    Correct Calcium 8.9 8.5 - 10.5 mg/dL    AST(SGOT) 22 12 - 45 U/L    ALT(SGPT) 19 2 - 50 U/L    Alkaline Phosphatase 102 (H) 30 - 99 U/L    Total Bilirubin <0.2 0.1 - 1.5 mg/dL    Albumin 4.0 3.2 - 4.9 g/dL    Total Protein 6.8 6.0 - 8.2 g/dL    Globulin 2.8 1.9 - 3.5 g/dL    A-G Ratio 1.4 g/dL   HCG QUAL SERUM   Result Value Ref Range    Beta-Hcg Qualitative Serum Negative Negative   ESTIMATED GFR   Result Value Ref Range     GFR (CKD-EPI) 128 >60 mL/min/1.73 m 2   URINALYSIS    Specimen: Urine, Clean Catch   Result Value Ref Range    Color Yellow     Character Cloudy (A)     Specific Gravity 1.028 <1.035    Ph 8.0 5.0 - 8.0    Glucose Negative Negative mg/dL    Ketones Negative Negative mg/dL    Protein 30 (A) Negative mg/dL    Bilirubin Negative Negative    Urobilinogen, Urine 0.2 Negative    Nitrite Negative Negative    Leukocyte Esterase Negative Negative    Occult Blood Negative Negative    Micro Urine Req Microscopic    URINE MICROSCOPIC (W/UA)   Result Value Ref Range    WBC 0-2 /hpf    RBC 2-5 (A) /hpf    Bacteria Negative None /hpf    Epithelial Cells Negative /hpf    Hyaline Cast 0-2 /lpf     I have independently interpreted this EKG    RADIOLOGY/PROCEDURES     Radiologist interpretation:  US-PELVIC COMPLETE (TRANSABDOMINAL/TRANSVAGINAL) (COMBO)   Final Result      1.  IUD appears in satisfactory position in the endometrial cavity.   2.  Right ovary shows a small cysts x2 most consistent with physiologic follicular cyst.   3.  Left ovary shows a moderate-sized 4.37 x 4.01 x 3.96 cm cyst which harbors a daughter cyst along the peripheral wall. This is also most consistent with a physiologic follicular cyst.          COURSE & MEDICAL DECISION MAKING    ASSESSMENT, COURSE AND PLAN  Care Narrative:   This is a pleasant 24-year-old female.  Unfortunately, she suffers with chronic pelvic pain.  Recently, about a month ago, obtained a diagnosis of endometriosis based on an exploratory laparotomy.  A week ago, she had an IUD placed.  She is having left lower quadrant abdominal pain that she has had in the past and typically has been associated with ovarian cysts.  No fever.  Nausea but no vomiting.  Labs been obtained per nursing protocols and are available upon my initial evaluation.  Overall, fairly reassuring.  With blood cell count 11, previously 9.8.  H&H 11 and 35 just slightly lower than 2 months ago.  There is no shift.   Chemistries unrevealing.  Await urine analysis.  Pregnancy test is negative.  Have ordered ultrasound for further evaluation.    8:27 PM patient is reevaluated at the bedside.  She is reporting much improvement in her pain.  We discussed ultrasound findings and I discussed with her, comparison to multiple previous ultrasounds.  She has spoken with her OB/GYN at length about remedies for her ongoing symptoms.  Unfortunately, hysterectomy may not be helpful due to the location of her endometriosis.  She will follow-up with him tomorrow, advise him of ultrasound findings.  They can discuss ongoing care.  She will be discharged home with a short course of oxycodone.  She is given strict return precautions.  We discussed signs and symptoms of ovarian torsion and she is encouraged to come back if her pain is worsening.  She voices understanding.  She is improved.  Her vital signs are reassuring.  She is discharged home in stable condition.     ADDITIONAL PROBLEMS MANAGED    DISPOSITION AND DISCUSSIONS  I have discussed management of the patient with the following physicians and STEPHANIE's:  None    Discussion of management with other QHP or appropriate source(s):      Escalation of care considered, and ultimately not performed: None    Barriers to care at this time, including but not limited to: None.     Decision tools and prescription drugs considered including, but not limited to: None.    FINAL DIAGNOSIS  1. Chronic pain in female pelvis    2. History of endometriosis    3. Cysts of both ovaries         Electronically signed by: Susana Lozoya D.O., 9/17/2024 4:55 PM

## 2024-09-17 NOTE — ED TRIAGE NOTES
Chief Complaint   Patient presents with    Abdominal Pain     LLQ pain x 3 days worst today.     Vaginal Bleeding     Since this morning. Recently had an IUD placed last week.

## 2024-09-18 NOTE — ED NOTES
Pt. Able to ambulate out of ED with steady gait.  Verbalized understanding of all discharge instructions and f/u.  No acute distress noted.

## 2024-10-04 ENCOUNTER — APPOINTMENT (OUTPATIENT)
Dept: RADIOLOGY | Facility: MEDICAL CENTER | Age: 24
End: 2024-10-04
Attending: OBSTETRICS & GYNECOLOGY
Payer: COMMERCIAL

## 2024-11-05 ENCOUNTER — HOSPITAL ENCOUNTER (OUTPATIENT)
Dept: RADIOLOGY | Facility: MEDICAL CENTER | Age: 24
End: 2024-11-05
Attending: OBSTETRICS & GYNECOLOGY
Payer: COMMERCIAL

## 2024-11-05 DIAGNOSIS — Z30.430 ENCOUNTER FOR INSERTION OF INTRAUTERINE CONTRACEPTIVE DEVICE: ICD-10-CM

## 2024-11-05 PROCEDURE — 76830 TRANSVAGINAL US NON-OB: CPT

## 2024-12-18 ENCOUNTER — APPOINTMENT (OUTPATIENT)
Dept: RADIOLOGY | Facility: MEDICAL CENTER | Age: 24
End: 2024-12-18
Attending: OBSTETRICS & GYNECOLOGY
Payer: COMMERCIAL

## 2025-06-11 ENCOUNTER — APPOINTMENT (OUTPATIENT)
Dept: RADIOLOGY | Facility: IMAGING CENTER | Age: 25
End: 2025-06-11
Attending: FAMILY MEDICINE
Payer: COMMERCIAL

## 2025-06-11 ENCOUNTER — OFFICE VISIT (OUTPATIENT)
Dept: URGENT CARE | Facility: CLINIC | Age: 25
End: 2025-06-11
Payer: COMMERCIAL

## 2025-06-11 VITALS
DIASTOLIC BLOOD PRESSURE: 78 MMHG | HEIGHT: 67 IN | RESPIRATION RATE: 16 BRPM | WEIGHT: 172 LBS | OXYGEN SATURATION: 97 % | SYSTOLIC BLOOD PRESSURE: 108 MMHG | HEART RATE: 97 BPM | TEMPERATURE: 97.8 F | BODY MASS INDEX: 27 KG/M2

## 2025-06-11 DIAGNOSIS — M95.0 NASAL DEFORMITY: Primary | ICD-10-CM

## 2025-06-11 DIAGNOSIS — M95.0 NASAL DEFORMITY: ICD-10-CM

## 2025-06-11 PROCEDURE — 3078F DIAST BP <80 MM HG: CPT | Performed by: FAMILY MEDICINE

## 2025-06-11 PROCEDURE — 70160 X-RAY EXAM OF NASAL BONES: CPT | Mod: TC | Performed by: RADIOLOGY

## 2025-06-11 PROCEDURE — 99203 OFFICE O/P NEW LOW 30 MIN: CPT | Performed by: FAMILY MEDICINE

## 2025-06-11 PROCEDURE — 3074F SYST BP LT 130 MM HG: CPT | Performed by: FAMILY MEDICINE

## 2025-06-11 RX ORDER — TIRZEPATIDE 7.5 MG/.5ML
7.5 INJECTION, SOLUTION SUBCUTANEOUS
COMMUNITY

## 2025-06-11 ASSESSMENT — FIBROSIS 4 INDEX: FIB4 SCORE: 0.35

## 2025-06-11 NOTE — PROGRESS NOTES
"Subjective     Porfirio Keenan is a 24 y.o. female who presents with Other (L side of nose indent/pain  radiates to head  x2 wks/No injury to the area per pt )            2 weeks indentation left side of proximal nose.  She does wear glasses.  Associated pain radiates to left forehead.  No nasal drainage.  No fever.  No redness or warmth.  No other aggravating or alleviating factors.        Review of Systems   Skin:  Negative for itching and rash.              Objective     /78   Pulse 97   Temp 36.6 °C (97.8 °F) (Temporal)   Resp 16   Ht 1.702 m (5' 7\")   Wt 78 kg (172 lb)   SpO2 97%   BMI 26.94 kg/m²      Physical Exam  Constitutional:       Appearance: Normal appearance.   HENT:      Nose:     Neurological:      Mental Status: She is alert.               X-ray negative per radiology    1. Nasal deformity  DX-NASAL BONES 3+    Referral to Plastic Surgery        Differential diagnosis, natural history, supportive care, and indications for immediate follow-up were discussed.     Unclear etiology.    She has established with ENT and will reach out for further evaluation.  Referral to plastic surgery placed.        "

## 2025-06-11 NOTE — LETTER
June 11, 2025         Patient: Porfirio Keenan   YOB: 2000   Date of Visit: 6/11/2025           To Whom it May Concern:    Porfirio Keenan was seen in my clinic on 6/11/2025.       Sincerely,           Federico Burns M.D.  Electronically Signed

## 2025-06-15 ENCOUNTER — HOSPITAL ENCOUNTER (OUTPATIENT)
Dept: RADIOLOGY | Facility: MEDICAL CENTER | Age: 25
End: 2025-06-15
Attending: STUDENT IN AN ORGANIZED HEALTH CARE EDUCATION/TRAINING PROGRAM
Payer: COMMERCIAL

## 2025-06-15 DIAGNOSIS — J34.89 PAIN, NOSE: ICD-10-CM

## 2025-06-15 DIAGNOSIS — R51.9 FACIAL PAIN: ICD-10-CM

## 2025-06-15 PROCEDURE — 70486 CT MAXILLOFACIAL W/O DYE: CPT

## 2025-06-19 NOTE — Clinical Note
REFERRAL APPROVAL NOTICE         Sent on June 18, 2025                   Porfirio Keenan  [address] [facility] [address]                   Dear Ms. Keenan,    After a careful review of the medical information and benefit coverage, [facility] has processed your referral. See below for additional details.    If applicable, you must be actively enrolled with your insurance for coverage of the authorized service. If you have any questions regarding your coverage, please contact your insurance directly.    REFERRAL INFORMATION   Referral #:  [id number]  Referred-To Provider    Referred-By Provider:  Plastic Surgery    [facility]      [address]  [facility] [address]  [phone] [address]  [facility] [address]  [phone]    Referral Start Date:  06/11/2025  Referral End Date:   06/11/2026             SCHEDULING  If you do not already have an appointment, please call [phone] to make an appointment.     MORE INFORMATION  If you do not already have a [employer] account, sign up at: [email or web address]  You can access your medical information, make appointments, see lab results, billing information, and more.  If you have questions regarding this referral, please contact  the [facility] Referrals department at:             [phone]. Monday - Friday 8:00AM - 5:00PM.     Sincerely,    [facility]

## 2025-07-16 ENCOUNTER — HOSPITAL ENCOUNTER (EMERGENCY)
Facility: MEDICAL CENTER | Age: 25
End: 2025-07-16
Attending: STUDENT IN AN ORGANIZED HEALTH CARE EDUCATION/TRAINING PROGRAM
Payer: COMMERCIAL

## 2025-07-16 VITALS
RESPIRATION RATE: 18 BRPM | BODY MASS INDEX: 26.99 KG/M2 | HEIGHT: 67 IN | WEIGHT: 171.96 LBS | SYSTOLIC BLOOD PRESSURE: 107 MMHG | DIASTOLIC BLOOD PRESSURE: 69 MMHG | OXYGEN SATURATION: 96 % | TEMPERATURE: 97.8 F | HEART RATE: 89 BPM

## 2025-07-16 DIAGNOSIS — J02.9 PHARYNGITIS, UNSPECIFIED ETIOLOGY: Primary | ICD-10-CM

## 2025-07-16 DIAGNOSIS — J03.90 TONSILLITIS: ICD-10-CM

## 2025-07-16 LAB — S PYO DNA SPEC NAA+PROBE: NOT DETECTED

## 2025-07-16 PROCEDURE — 87070 CULTURE OTHR SPECIMN AEROBIC: CPT

## 2025-07-16 PROCEDURE — 96361 HYDRATE IV INFUSION ADD-ON: CPT

## 2025-07-16 PROCEDURE — 96375 TX/PRO/DX INJ NEW DRUG ADDON: CPT

## 2025-07-16 PROCEDURE — 700111 HCHG RX REV CODE 636 W/ 250 OVERRIDE (IP): Mod: JZ,UD | Performed by: STUDENT IN AN ORGANIZED HEALTH CARE EDUCATION/TRAINING PROGRAM

## 2025-07-16 PROCEDURE — A9270 NON-COVERED ITEM OR SERVICE: HCPCS | Mod: UD | Performed by: STUDENT IN AN ORGANIZED HEALTH CARE EDUCATION/TRAINING PROGRAM

## 2025-07-16 PROCEDURE — 96374 THER/PROPH/DIAG INJ IV PUSH: CPT

## 2025-07-16 PROCEDURE — 87651 STREP A DNA AMP PROBE: CPT

## 2025-07-16 PROCEDURE — 87077 CULTURE AEROBIC IDENTIFY: CPT

## 2025-07-16 PROCEDURE — 99285 EMERGENCY DEPT VISIT HI MDM: CPT

## 2025-07-16 PROCEDURE — 700105 HCHG RX REV CODE 258: Mod: UD | Performed by: STUDENT IN AN ORGANIZED HEALTH CARE EDUCATION/TRAINING PROGRAM

## 2025-07-16 PROCEDURE — 700102 HCHG RX REV CODE 250 W/ 637 OVERRIDE(OP): Mod: UD | Performed by: STUDENT IN AN ORGANIZED HEALTH CARE EDUCATION/TRAINING PROGRAM

## 2025-07-16 RX ORDER — KETOROLAC TROMETHAMINE 15 MG/ML
15 INJECTION, SOLUTION INTRAMUSCULAR; INTRAVENOUS ONCE
Status: COMPLETED | OUTPATIENT
Start: 2025-07-16 | End: 2025-07-16

## 2025-07-16 RX ORDER — SODIUM CHLORIDE, SODIUM LACTATE, POTASSIUM CHLORIDE, CALCIUM CHLORIDE 600; 310; 30; 20 MG/100ML; MG/100ML; MG/100ML; MG/100ML
1000 INJECTION, SOLUTION INTRAVENOUS ONCE
Status: COMPLETED | OUTPATIENT
Start: 2025-07-16 | End: 2025-07-16

## 2025-07-16 RX ORDER — DEXAMETHASONE SODIUM PHOSPHATE 4 MG/ML
8 INJECTION, SOLUTION INTRA-ARTICULAR; INTRALESIONAL; INTRAMUSCULAR; INTRAVENOUS; SOFT TISSUE ONCE
Status: COMPLETED | OUTPATIENT
Start: 2025-07-16 | End: 2025-07-16

## 2025-07-16 RX ORDER — PROCHLORPERAZINE EDISYLATE 5 MG/ML
10 INJECTION INTRAMUSCULAR; INTRAVENOUS ONCE
Status: COMPLETED | OUTPATIENT
Start: 2025-07-16 | End: 2025-07-16

## 2025-07-16 RX ORDER — ACETAMINOPHEN 325 MG/1
975 TABLET ORAL ONCE
Status: COMPLETED | OUTPATIENT
Start: 2025-07-16 | End: 2025-07-16

## 2025-07-16 RX ADMIN — DEXAMETHASONE SODIUM PHOSPHATE 8 MG: 4 INJECTION, SOLUTION INTRAMUSCULAR; INTRAVENOUS at 03:17

## 2025-07-16 RX ADMIN — SODIUM CHLORIDE, POTASSIUM CHLORIDE, SODIUM LACTATE AND CALCIUM CHLORIDE 1000 ML: 600; 310; 30; 20 INJECTION, SOLUTION INTRAVENOUS at 03:15

## 2025-07-16 RX ADMIN — ACETAMINOPHEN 975 MG: 325 TABLET ORAL at 03:14

## 2025-07-16 RX ADMIN — PROCHLORPERAZINE EDISYLATE 10 MG: 5 INJECTION INTRAMUSCULAR; INTRAVENOUS at 03:20

## 2025-07-16 RX ADMIN — KETOROLAC TROMETHAMINE 15 MG: 15 INJECTION, SOLUTION INTRAMUSCULAR; INTRAVENOUS at 03:24

## 2025-07-16 ASSESSMENT — FIBROSIS 4 INDEX: FIB4 SCORE: 0.35

## 2025-07-16 NOTE — ED TRIAGE NOTES
"Chief Complaint   Patient presents with    Sore Throat     Pt reports sore throat and swollen tonsils started yesterday and has worsened. R tonsil significantly swollen      /85   Pulse 70   Temp 36.6 °C (97.8 °F) (Temporal)   Resp 18   Ht 1.702 m (5' 7\")   Wt 78 kg (171 lb 15.3 oz)   SpO2 96%   BMI 26.93 kg/m²     Pt here for above cc  States sore throat and swollen tonsils started yesterday. Pt went to PCP office yesterday for it and tested for strep which she reports came back negative  Pt here as swelling woke her out of sleep and has significantly worsened    Pt throat red w/ noted tonsils to be swollen. R tonsil noted to be significantly swollen in triage and disrupting pt ability to swallow and talk    Pt to yuri, educated on rooming process     "

## 2025-07-16 NOTE — ED NOTES
Pt ambulated from lobby to Red 5 without assistance, tolerated well. Pt placed in hospital gown and is now sitting up in bed with even and unlabored breaths, in no apparent distress at this time. Will continue to monitor pt while awaiting orders.

## 2025-07-16 NOTE — LETTER
7/18/2025               Porfirio Keenan  [address] [facility] [address]        Dear Porfirio (MR#[id number])    As we have been unable to contact you by phone, this letter is sent in regards to your recent visit to the [facility] Emergency Department on 7/16/2025. During the visit, tests were performed to assist the physician in a medical diagnosis. A review of those tests requires that we notify you of the following:    Your throat culture and sensitivity was POSITIVE for a bacteria called Group C Streptococcus, and further treatment may be necessary.  IF YOU ARE NOT FEELING BETTER PLEASE CONTACT ME AS SOON AS POSSIBLE AT THE NUMBER BELOW.       Thank you for your cooperation in the matter.    Sincerely,  ED Culture Follow-Up Staff  Veda Lima, PharmD    [facility], Emergency Department  [address] [place] [id number] [phone]

## 2025-07-16 NOTE — ED NOTES
Pt medicated per MAR and tolerated well. Strep swab and throat culture swab collected as well and sent to lab. Pt provided warm blankets and lights turned off for comfort at her request. Will continue to monitor.

## 2025-07-16 NOTE — ED PROVIDER NOTES
"ER Provider Note    Scribed for Dr. Alex Bernardo MD. by Charlotte Tompkins. 7/16/2025  2:53 AM    Primary Care Provider: CECILY Pope    CHIEF COMPLAINT  Chief Complaint   Patient presents with    Sore Throat     Pt reports sore throat and swollen tonsils started yesterday and has worsened. R tonsil significantly swollen        EXTERNAL RECORDS REVIEWED  Outpatient Notes The patient states she was seen yesterday by her primary care physician earlier and had a negative strep test.     HPI/ROS  LIMITATION TO HISTORY   Select: : None    OUTSIDE HISTORIAN(S):  None    Porfirio Keenan is a 24 y.o. female who presents to the ED for evaluation of a persistent sore throat onset two days ago. She was seen yesterday at her primary care office for the same symptoms. The patient notes associated difficulty swallowing, headache, and cough. She denies any fevers. The patient reports she recently had COVID and a broken nose. She has a history of strep and tonsillitis.     PAST MEDICAL HISTORY  Past Medical History[1]    SURGICAL HISTORY  Past Surgical History[2]    FAMILY HISTORY  History reviewed. No pertinent family history.    SOCIAL HISTORY   reports that she has never smoked. She has never been exposed to tobacco smoke. She has never used smokeless tobacco. She reports current alcohol use. She reports that she does not use drugs.    CURRENT MEDICATIONS  Current Outpatient Medications   Medication Instructions    ibuprofen (MOTRIN) 800 mg, 1 TIME DAILY PRN    Wegovy 1.7 mg, EVERY 7 DAYS    Zepbound 7.5 mg, EVERY 7 DAYS      ALLERGIES  Patient has no known allergies.    PHYSICAL EXAM  /85   Pulse 70   Temp 36.6 °C (97.8 °F) (Temporal)   Resp 18   Ht 1.702 m (5' 7\")   Wt 78 kg (171 lb 15.3 oz)   SpO2 96%   BMI 26.93 kg/m²   Physical Exam  Vitals and nursing note reviewed.   Constitutional:       Appearance: She is well-developed.   HENT:      Head: Normocephalic.      Mouth/Throat:      Comments: Bilateral 3+ " tonsillar hypertrophy with erythema. No exudate. Uvula is midline. No peritonsillar abscess present.   Eyes:      Extraocular Movements: Extraocular movements intact.      Pupils: Pupils are equal, round, and reactive to light.   Neck:      Comments: Bilateral anterior cervical lymphadenopathy.   Cardiovascular:      Rate and Rhythm: Normal rate and regular rhythm.   Pulmonary:      Effort: Pulmonary effort is normal.      Breath sounds: Normal breath sounds.   Abdominal:      Palpations: Abdomen is soft.      Tenderness: There is no abdominal tenderness.   Musculoskeletal:      Cervical back: Normal range of motion.   Neurological:      Mental Status: She is alert and oriented to person, place, and time.       DIAGNOSTIC STUDIES & PROCEDURES    Labs:   Results for orders placed or performed during the hospital encounter of 07/16/25   Group A Strep by PCR    Collection Time: 07/16/25  3:25 AM    Specimen: Throat   Result Value Ref Range    Group A Strep by PCR Not Detected Not Detected      All labs reviewed by me.    COURSE & MEDICAL DECISION MAKING    INITIAL ASSESSMENT AND PLAN  Care Narrative:   Hydration: Based on the patient's presentation of Dehydration the patient was given IV fluids. IV Hydration was used because oral hydration was not adequate alone. Upon recheck following hydration, the patient was improved.      2:53 AM - Patient seen and evaluated at bedside.  24-year-old female presenting for severe sore throat strep negative at PCP office yesterday.  Difficulty swallowing due to pain otherwise no voice changes, trismus, uvular deviation.  Patient otherwise well-appearing on exam with normal vital signs.  Significant bilateral tonsillar hypertrophy is present otherwise no signs of PTA or deep space infection that would warrant cross-sectional imaging.  Patient is again strep negative and a culture has been sent.  Decadron given for symptomatic management and patient with significant improvement.   Appropriate return precautions given we will hold on antibiotic therapy at this time    4:28 AM - I reevaluated the patient at bedside. The patient informs me they feel improved following medication administration, but anxious to go home. I discussed the patient's diagnostic study results which show no evidence of strep infection. I discussed plan for discharge and follow up as outlined below. The patient is stable for discharge at this time and will return for any new or worsening symptoms. Patient verbalizes understanding and support with my plan for discharge.                 DISPOSITION AND DISCUSSIONS  I have discussed management of the patient with the following physicians and STEPHANIE's: None    Discussion of management with other Q or appropriate source(s): None     Escalation of care considered, and ultimately not performed: .    Barriers to care at this time, including but not limited to: None known.     Decision tools and prescription drugs considered including, but not limited to: Antibiotics not beneficial given likely viral etiology of symptoms.    The patient will return for new or worsening symptoms and is stable at the time of discharge.    DISPOSITION:  Patient will be discharged home in stable condition.    FOLLOW UP:  Tez Paulson P.AElena  [address] [facility] [address]  [phone]          [facility], Emergency Dept  [address] [facility] [address] [phone]        FINAL IMPRESSION   1. Pharyngitis, unspecified etiology    2. Tonsillitis      Charlotte POSADA (Scribe), am scribing for, and in the presence of, Alex Bernardo M.D..    Electronically signed by: Charlotte Tompkins (Sriniibrogelio), 7/16/2025    Alex POSADA M.D. personally performed the services described in this documentation, as scribed by Charlotte Tompkins in my presence, and it is both accurate and complete.    The note accurately reflects work and decisions made by me.  Alex Bernardo M.D.  7/16/2025   7:49 AM         [1]   Past Medical History:  Diagnosis Date    Acid reflux     Anemia     Anxiety disorder 07/26/2024    Bowel habit changes 07/26/2024    constipation    Colitis     Gynecological disorder     Hypertension 07/26/2024    pre-eclampsia with both pregnancies    Pain 07/26/2024    pelvic pain radiating to the back    Postpartum depression 07/26/2024    hx of postpartum depression    PTSD (post-traumatic stress disorder) 07/26/2024   [2]   Past Surgical History:  Procedure Laterality Date    ID LAP,DIAGNOSTIC ABDOMEN N/A 8/20/2024    Procedure: DIAGNOSTIC LAPAROSCOPY,EXCISION/ TREATMENT OF ENDOMETRIOSIS, BILATERAL OVARIAN DRILLING;  Surgeon: Aureliano Modi D.O.;  Location: SURGERY SAME DAY [facility];  Service: Gynecology    NO PERTINENT PAST SURGICAL HISTORY

## 2025-07-18 LAB
BACTERIA SPEC RESP CULT: ABNORMAL
BACTERIA SPEC RESP CULT: ABNORMAL
SIGNIFICANT IND 70042: ABNORMAL
SITE SITE: ABNORMAL
SOURCE SOURCE: ABNORMAL

## 2025-07-18 NOTE — ED NOTES
ED Positive Culture Follow-up/Notification Note:   Date: 7/18/2025    Patient seen in the ED on 7/16/2025 for severe sore throat and swollen tonsils. Recent COVID infection. Group A Strep PCR obtained at urgent care, negative. No exudate noted on tonsils. Vitals unremarkable. Cultures of tonsils obtained and patient discharged. Cultures positive for Group C streptococcus.    1. Pharyngitis, unspecified etiology    2. Tonsillitis      Discharge Medication List as of 7/16/2025  4:35 AM        Allergies: Patient has no known allergies.    Vitals:    07/16/25 0300 07/16/25 0330 07/16/25 0400 07/16/25 0430   BP: 125/78 101/52 107/69    Pulse: 90 91 75 89   Resp: 20  18    Temp:       TempSrc:       SpO2: 97% 94% 95% 96%   Weight:       Height:           Final cultures:   Results       Procedure Component Value Units Date/Time    CULTURE THROAT [[id number]]  (Abnormal) Collected: 07/16/25 0326    Order Status: Completed Specimen: Throat Updated: 07/18/25 0959     Significant Indicator POS     Source THRT     Site THROAT     Culture Result Heavy growth usual upper respiratory silvia  No group A beta Streptococcus isolated.        Beta Streptococcus Group C  Heavy growth      Group A Strep by PCR [[id number]] Collected: 07/16/25 0325    Order Status: Completed Specimen: Throat Updated: 07/16/25 0434     Group A Strep by PCR Not Detected          Plan:   Suspected viral, no antibiotics prescribed. Left voicemail and MyChart message with patient. If not improving, would initiate antibiotics as Group C streptococcus can be a cause of pharyngitis although not as common as Group A strep and would not be detected by PCR.   ADDENDUM: Discussed with patient, she reports considerable improvement in symptoms without antibiotics. Based on this, more likely a viral etiology and the Group C strep is a colonizer and non-pathogenic. No antibiotics indicated at this time.   Veda Lima, PharmD

## 2025-08-20 ENCOUNTER — HOSPITAL ENCOUNTER (OUTPATIENT)
Facility: MEDICAL CENTER | Age: 25
End: 2025-08-20
Attending: OBSTETRICS & GYNECOLOGY
Payer: COMMERCIAL

## 2025-08-20 LAB — AMBIGUOUS DTTM AMBI4: NORMAL

## 2025-08-20 PROCEDURE — 87491 CHLMYD TRACH DNA AMP PROBE: CPT

## 2025-08-20 PROCEDURE — 87591 N.GONORRHOEAE DNA AMP PROB: CPT

## 2025-08-21 LAB
C TRACH DNA GENITAL QL NAA+PROBE: NEGATIVE
N GONORRHOEA DNA GENITAL QL NAA+PROBE: NEGATIVE
SPECIMEN SOURCE: NORMAL

## (undated) DEVICE — WATER IRRIGATION STERILE 1000ML (12EA/CA)

## (undated) DEVICE — SET TUBING PNEUMOCLEAR HIGH FLOW SMOKE EVACUATION (10EA/BX)

## (undated) DEVICE — HEMOSTAT ABSORBABLE POWDER SURGICEL 3G (5EA/BX)

## (undated) DEVICE — TUBING CLEARLINK DUO-VENT - C-FLO (48EA/CA)

## (undated) DEVICE — TRAY FOLEY CATHETER STATLOCK 16FR SURESTEP (10EA/CA)

## (undated) DEVICE — Device

## (undated) DEVICE — TROCAR 5X100 SEPARTATOR ADV - FIXATION (6/BX)

## (undated) DEVICE — MASK OXYGEN VNYL ADLT MED CONC WITH 7 FOOT TUBING - (50EA/CA)

## (undated) DEVICE — GOWN WARMING STANDARD FLEX - (30/CA)

## (undated) DEVICE — TROCAR LAPSCP 100MM 12MM NTHRD - (6/BX)

## (undated) DEVICE — SUTURE GENERAL

## (undated) DEVICE — PAD SANITARY 11IN MAXI IND WRAPPED (12EA/PK 24PK/CA)

## (undated) DEVICE — CANNULA O2 COMFORT SOFT EAR ADULT 7 FT TUBING (50/CA)

## (undated) DEVICE — SUCTION INSTRUMENT YANKAUER BULBOUS TIP W/O VENT (50EA/CA)

## (undated) DEVICE — CANISTER SUCTION RIGID RED 1500CC (40EA/CA)

## (undated) DEVICE — DRESSING NON-ADHERING 8 X 3 - (50/BX)

## (undated) DEVICE — SODIUM CHL IRRIGATION 0.9% 1000ML (12EA/CA)

## (undated) DEVICE — SUTURE 4-0 MONOCRYL PLUS PS-2 - 27 INCH (36/BX)

## (undated) DEVICE — TOWEL STOP TIMEOUT SAFETY FLAG (40EA/CA)

## (undated) DEVICE — DRAPESURG STERI-DRAPE LONG - (10/BX 4BX/CA)

## (undated) DEVICE — SUTURE 0 VICRYL PLUS UR-6 - 27 INCH (36/BX)

## (undated) DEVICE — APPLICATOR ENDOSCOPIC SURGICEL (5EA/BX)

## (undated) DEVICE — GLOVE BIOGEL PI INDICATOR SZ 8.0 SURGICAL PF LF -(50/BX 4BX/CA)

## (undated) DEVICE — KIT  I.V. START (100EA/CA)

## (undated) DEVICE — SET LEADWIRE 5 LEAD BEDSIDE DISPOSABLE ECG (1SET OF 5/EA)

## (undated) DEVICE — CANISTER SUCTION 3000ML MECHANICAL FILTER AUTO SHUTOFF MEDI-VAC NONSTERILE LF DISP (40EA/CA)

## (undated) DEVICE — LACTATED RINGERS INJ 1000 ML - (14EA/CA 60CA/PF)

## (undated) DEVICE — ELECTRODE DUAL RETURN W/ CORD - (50/PK)

## (undated) DEVICE — SLEEVE VASO CALF MED - (10PR/CA)

## (undated) DEVICE — TUBE CONNECTING SUCTION - CLEAR PLASTIC STERILE 72 IN (50EA/CA)

## (undated) DEVICE — PROBE LAP LPT-1118A - 10/BX

## (undated) DEVICE — SENSOR OXIMETER ADULT SPO2 RD SET (20EA/BX)